# Patient Record
Sex: MALE | Race: WHITE | Employment: OTHER | URBAN - METROPOLITAN AREA
[De-identification: names, ages, dates, MRNs, and addresses within clinical notes are randomized per-mention and may not be internally consistent; named-entity substitution may affect disease eponyms.]

---

## 2018-08-08 ENCOUNTER — OFFICE VISIT (OUTPATIENT)
Dept: INTERNAL MEDICINE CLINIC | Facility: CLINIC | Age: 33
End: 2018-08-08
Payer: MEDICAID

## 2018-08-08 VITALS — DIASTOLIC BLOOD PRESSURE: 76 MMHG | TEMPERATURE: 98 F | SYSTOLIC BLOOD PRESSURE: 133 MMHG | HEART RATE: 67 BPM

## 2018-08-08 DIAGNOSIS — G89.29 CHRONIC NONINTRACTABLE HEADACHE, UNSPECIFIED HEADACHE TYPE: Primary | ICD-10-CM

## 2018-08-08 DIAGNOSIS — R33.9 URINARY RETENTION: ICD-10-CM

## 2018-08-08 DIAGNOSIS — R51.9 CHRONIC NONINTRACTABLE HEADACHE, UNSPECIFIED HEADACHE TYPE: Primary | ICD-10-CM

## 2018-08-08 DIAGNOSIS — G82.20 PARAPLEGIA AT T4 LEVEL (HCC): ICD-10-CM

## 2018-08-08 PROCEDURE — 99212 OFFICE O/P EST SF 10 MIN: CPT | Performed by: INTERNAL MEDICINE

## 2018-08-08 PROCEDURE — 99202 OFFICE O/P NEW SF 15 MIN: CPT | Performed by: INTERNAL MEDICINE

## 2018-08-08 RX ORDER — HYDROCODONE BITARTRATE AND ACETAMINOPHEN 10; 325 MG/1; MG/1
1 TABLET ORAL EVERY 4 HOURS PRN
Refills: 0 | COMMUNITY
Start: 2018-07-24

## 2018-08-08 RX ORDER — OXYCODONE HYDROCHLORIDE 30 MG/1
30 TABLET ORAL EVERY 8 HOURS PRN
COMMUNITY
Start: 2018-06-28

## 2018-08-08 RX ORDER — OXYCODONE HYDROCHLORIDE 15 MG/1
15 TABLET ORAL EVERY 8 HOURS PRN
COMMUNITY
Start: 2018-06-28

## 2018-08-08 RX ORDER — ALPRAZOLAM 1 MG/1
1 TABLET ORAL 2 TIMES DAILY
Refills: 3 | COMMUNITY
Start: 2018-06-24 | End: 2021-08-05

## 2018-08-08 NOTE — PROGRESS NOTES
HPI:    Patient ID: Kamran Lewis is a 33year old male. Headache    This is a chronic problem. The current episode started more than 1 month ago (6 mos). The problem occurs constantly. The problem has been waxing and waning.  The pain is located in t distress. He has no wheezes. He has no rales. Lymphadenopathy:     He has no cervical adenopathy. Neurological: He is alert. paraplegic   Skin: He is not diaphoretic.               ASSESSMENT/PLAN:   (R51) Chronic nonintractable headache, unspecified

## 2018-08-09 PROBLEM — G89.29 CHRONIC NONINTRACTABLE HEADACHE: Status: ACTIVE | Noted: 2018-08-09

## 2018-08-09 PROBLEM — G82.20 PARAPLEGIA AT T4 LEVEL (HCC): Status: ACTIVE | Noted: 2018-08-09

## 2018-08-09 PROBLEM — R51.9 CHRONIC NONINTRACTABLE HEADACHE: Status: ACTIVE | Noted: 2018-08-09

## 2018-09-10 ENCOUNTER — TELEPHONE (OUTPATIENT)
Dept: INTERNAL MEDICINE CLINIC | Facility: CLINIC | Age: 33
End: 2018-09-10

## 2018-09-10 NOTE — TELEPHONE ENCOUNTER
Preston/pt's father asking if Dr [place] would prescribe   Norco, OxyContin & Roxicodone for pt    Saw Dr Narcisa Ochoa 8/8  Did not provide any other information

## 2018-09-10 NOTE — TELEPHONE ENCOUNTER
I already told them before I will not prescribe these pain meds and he will need to see neurologist. He was already given referral last month.  See my progress note

## 2018-09-10 NOTE — TELEPHONE ENCOUNTER
Spoke with father and relayed EL message below--also relayed Dr. Adrien Álvarez office # to schedule appt--he verbalizes understanding and agreement. No further questions/concerns at this time.

## 2018-09-13 ENCOUNTER — TELEPHONE (OUTPATIENT)
Dept: INTERNAL MEDICINE CLINIC | Facility: CLINIC | Age: 33
End: 2018-09-13

## 2018-09-13 DIAGNOSIS — R51.9 PERSISTENT HEADACHES: Primary | ICD-10-CM

## 2018-09-13 NOTE — TELEPHONE ENCOUNTER
Insurance will likely deny request for MRI brain since it's coming from PCP/family doctor instead of neurologist that is why I had told to to see neurologist before.  I will order it for him and if medicaid denies it, then as I said before see neurologist.

## 2018-09-13 NOTE — TELEPHONE ENCOUNTER
Called pt to ask if attempt has been made to schedule appt with [facility] but per Mariluz Thibodeaux his step father is the person who handles his health, was suppose to call to make an appt. Pt will ask father to call us back.      Dr. MUNOZ pls advise on MRI order, waiting to hear back from pt's father

## 2018-10-05 ENCOUNTER — HOSPITAL ENCOUNTER (OUTPATIENT)
Dept: MRI IMAGING | Age: 33
Discharge: HOME | End: 2018-10-05
Attending: INTERNAL MEDICINE
Payer: MEDICAID

## 2018-10-05 DIAGNOSIS — R51.9 PERSISTENT HEADACHES: ICD-10-CM

## 2018-10-05 PROCEDURE — 70551 MRI BRAIN STEM W/O DYE: CPT | Performed by: INTERNAL MEDICINE

## 2018-10-17 ENCOUNTER — TELEPHONE (OUTPATIENT)
Dept: OTHER | Age: 33
End: 2018-10-17

## 2018-10-17 NOTE — TELEPHONE ENCOUNTER
Office staff=please fax the needed notes/information.     DME supplier requesting any Progress notes, phone notes and any notes the reason the patient is requiring catheter for the insurance purposes, patient's insurance will not cover the catheter unless t

## 2018-10-20 ENCOUNTER — TELEPHONE (OUTPATIENT)
Dept: INTERNAL MEDICINE CLINIC | Facility: CLINIC | Age: 33
End: 2018-10-20

## 2018-10-20 DIAGNOSIS — G82.20 PARAPLEGIA AT T4 LEVEL (HCC): Primary | ICD-10-CM

## 2018-10-20 NOTE — TELEPHONE ENCOUNTER
Regarding: Non-Urgent Medical Question  Contact: [phone]  ----- Message from Generic, Mychart sent at 10/19/2018  9:19 PM CDT -----    Doctor Hi,     My Medical supplier who sends me my Cathethers monthly had sent your office a fax requesting more inf

## 2018-10-23 ENCOUNTER — TELEPHONE (OUTPATIENT)
Dept: INTERNAL MEDICINE CLINIC | Facility: CLINIC | Age: 33
End: 2018-10-23

## 2018-10-23 NOTE — TELEPHONE ENCOUNTER
Elvira from [employer] is calling she will be faxing over orders for pt to get catheters.  Please sign and fax back to   Fax# [phone]

## 2018-10-24 ENCOUNTER — TELEPHONE (OUTPATIENT)
Dept: OTHER | Age: 33
End: 2018-10-24

## 2018-10-24 NOTE — TELEPHONE ENCOUNTER
Call received from Rep of [address] stated Pt there to p/u catheter supplies-stated Pt uses 16 FR but they only have 14 FR  Asking if ok to dispense

## 2018-10-24 NOTE — TELEPHONE ENCOUNTER
Calling and requesting to fax all the information needed to [id number] [facility] fax number [phone] ASAP. Suly Master Suly Master

## 2018-10-24 NOTE — TELEPHONE ENCOUNTER
Gloria Broach with me if pt agrees with change in size of catheter.  They may just get few catheter just for him to have some for now and then can get his regular catheter size

## 2018-10-24 NOTE — TELEPHONE ENCOUNTER
Elvira from [facility] calling to follow up.     # [phone]  [facility] 4

## 2018-10-24 NOTE — TELEPHONE ENCOUNTER
Elvira from [address] [place] [address] calling back regarding paperwork that needed to be signed by Dr Keyonna García so patient could get his catheters. Please fax back ASAPPeter Felix would like to provide patient with a short supply of 36 catheters until doctor signs of

## 2018-10-24 NOTE — TELEPHONE ENCOUNTER
Preston (caregiver ) calling and informed this nurse that patient really needs the catheter and Elvira from [facility] is willing to give it today as long as there is a verbal/phone approval from PCP.   Contacted Dr Anant Crawford via [employer], and with order to give Nikita Diaz

## 2018-10-25 NOTE — TELEPHONE ENCOUNTER
This is a new pt of the clinic and this is the first time we are filling this request from the patient. If they have latex free then give that one to the patient instead. I had only signed the request form they had faxed to us before.  I am not the original

## 2018-10-25 NOTE — TELEPHONE ENCOUNTER
Preston Caregiver indicated to disregard the request from [facility] since they do not carry the exact catheters that patient needs.  [employer] [phone], representative [phone] carries the 12 FR catheters that the patient needs and they will be faxing ov

## 2018-10-25 NOTE — TELEPHONE ENCOUNTER
[facility] has no outpt pharmacy anymore.   I did sign the form from [employer] for his catheters and is being faxed back now to the company,

## 2018-10-25 NOTE — TELEPHONE ENCOUNTER
Call transferred by CSS: Christal Levin from [facility] informed of EL's response below and will dispense catheters accordingly. Christal Levin now asking if there is a reason why this pt is on the red rubber (latex) catheters?  States most are latex free and they d

## 2018-10-26 NOTE — TELEPHONE ENCOUNTER
We had faxed the form for his cathteters yesterday  As to referral for Counts include 234 beds at the [facility], he has public aid insurance and only good for [place] as I had explained to him before.   Ok to give order for wheel chair

## 2018-10-26 NOTE — TELEPHONE ENCOUNTER
Jennifer Padgett from [employer] is calling states she needs note that support that rx was approved for his catheters.    Fax [phone]

## 2018-10-26 NOTE — TELEPHONE ENCOUNTER
Dr. Chet Anthony please see note below. Nothing in note with you about catheter. Would you be able to write a note for medical necessity?

## 2018-10-26 NOTE — TELEPHONE ENCOUNTER
Called pt and lmtcb. pls inform patient of message below from Dr. Nathan Aviles for DME wheel chair ready to  as well.

## 2018-10-29 ENCOUNTER — TELEPHONE (OUTPATIENT)
Dept: INTERNAL MEDICINE CLINIC | Facility: CLINIC | Age: 33
End: 2018-10-29

## 2018-10-29 NOTE — TELEPHONE ENCOUNTER
Shahla Rodriguez called in from [employer] requesting to edit pt's order. Shahla Rodriguez stated that pt must Self Cath 8 times per day for permanent urinal retention. Shahla Rodriguez is requesting to have pt's order faxed to [phone].   Please advise

## 2018-11-06 ENCOUNTER — TELEPHONE (OUTPATIENT)
Dept: INTERNAL MEDICINE CLINIC | Facility: CLINIC | Age: 33
End: 2018-11-06

## 2018-12-28 ENCOUNTER — TELEPHONE (OUTPATIENT)
Dept: INTERNAL MEDICINE CLINIC | Facility: CLINIC | Age: 33
End: 2018-12-28

## 2018-12-28 NOTE — TELEPHONE ENCOUNTER
Received follow up call from [facility] with Jarrell Valentino, they received orders to supply patient with his urine catheters.  They initially spoke with patient and advised him that the catheter brand he was requesting (closed system, red rubber catheter) t

## 2018-12-31 NOTE — TELEPHONE ENCOUNTER
Spoke with Aurelio Nyhan informed per pt he is staying with [facility], his medical supplier. She voiced understanding.

## 2019-03-12 ENCOUNTER — TELEPHONE (OUTPATIENT)
Dept: SURGERY | Facility: CLINIC | Age: 34
End: 2019-03-12

## 2019-03-12 NOTE — TELEPHONE ENCOUNTER
Patient scheduled himself via Mychart with Ventura DEWEY to discuss urology surgeries.  Per Ventura Hernandez, patient should see a Urologist.    Dr. Urbano Venegas, ok to schedule patient next week Thursday 3/21 at 2:00 pm? (open procedure spot)

## 2019-03-12 NOTE — TELEPHONE ENCOUNTER
He can see Warden Gutierrez initially to establish care with the group. I'm not sure he needs surgery. You can also see if one of my partners has any opening in their schedule. Please do not use a procedure slot for this one. Thanks!

## 2019-03-25 ENCOUNTER — PATIENT MESSAGE (OUTPATIENT)
Dept: INTERNAL MEDICINE CLINIC | Facility: CLINIC | Age: 34
End: 2019-03-25

## 2019-03-25 NOTE — PROGRESS NOTES
Patient called to follow up on referral status. He states he needs a referral for an out-of-network urologist at [address] [place] [address]. Their fax # [phone]    Staff, please call back pt when referral has been sent.

## 2019-03-26 NOTE — TELEPHONE ENCOUNTER
From: Leslie Jones  To: Sade Contreras MD  Sent: 3/25/2019 11:13 AM CDT  Subject: Referral Request    Hi,     I need a referral to go see a Urologist. Can you guys fax it over?  Call me for the information please   [phone]

## 2019-04-23 ENCOUNTER — TELEPHONE (OUTPATIENT)
Dept: SURGERY | Facility: CLINIC | Age: 34
End: 2019-04-23

## 2019-04-23 ENCOUNTER — OFFICE VISIT (OUTPATIENT)
Dept: SURGERY | Facility: CLINIC | Age: 34
End: 2019-04-23
Payer: MEDICAID

## 2019-04-23 VITALS — DIASTOLIC BLOOD PRESSURE: 65 MMHG | TEMPERATURE: 98 F | SYSTOLIC BLOOD PRESSURE: 123 MMHG | HEART RATE: 68 BPM

## 2019-04-23 DIAGNOSIS — N31.9 NEUROGENIC BLADDER: Primary | ICD-10-CM

## 2019-04-23 PROCEDURE — 99244 OFF/OP CNSLTJ NEW/EST MOD 40: CPT | Performed by: UROLOGY

## 2019-04-23 PROCEDURE — 99212 OFFICE O/P EST SF 10 MIN: CPT | Performed by: UROLOGY

## 2019-04-23 NOTE — TELEPHONE ENCOUNTER
Dr. Dre Pearson ordered US KIDNEY/BLADDER (SCV=[id number]). Please call insurance to obtain prior authorization. Patient was instructed to wait for our call before scheduling.

## 2019-04-23 NOTE — PROGRESS NOTES
SUBJECTIVE:  Nazia Kwok is a 29year old male who presents for a consultation at the request of, and a copy of this note will be sent to, Dr. Ilan Yu, for evaluation of  Neurogenic bladder and stress incontinence.  He states that the prob surgery for chest nerves cut and burnt off       Family History   Problem Relation Age of Onset   • Cancer Maternal Grandmother         Breast Cancer. • Cancer Maternal Grandfather         Lung Cancer- non smoker.        Social History: Social History

## 2019-04-24 ENCOUNTER — PATIENT MESSAGE (OUTPATIENT)
Dept: SURGERY | Facility: CLINIC | Age: 34
End: 2019-04-24

## 2019-04-24 ENCOUNTER — TELEPHONE (OUTPATIENT)
Dept: OTHER | Age: 34
End: 2019-04-24

## 2019-04-24 ENCOUNTER — PATIENT MESSAGE (OUTPATIENT)
Dept: INTERNAL MEDICINE CLINIC | Facility: CLINIC | Age: 34
End: 2019-04-24

## 2019-04-24 DIAGNOSIS — M54.6 CHRONIC MIDLINE THORACIC BACK PAIN: Primary | ICD-10-CM

## 2019-04-24 DIAGNOSIS — G89.29 CHRONIC MIDLINE THORACIC BACK PAIN: Primary | ICD-10-CM

## 2019-04-24 NOTE — TELEPHONE ENCOUNTER
Preston Caregiver called requesting referral for the pain clinic. States pt is a paraplegic and has Neuropathic pain. Verbalized pain medication is not helping and pt is looking for an alternative.  Will like the referral to be faxed to the pain clinic at the

## 2019-04-24 NOTE — TELEPHONE ENCOUNTER
From: Zane Lugo  To: Charlene Matt MD  Sent: 4/24/2019 11:30 AM CDT  Subject: Referral Request    Hello,     I need a Referral to [facility].  The FAX # is: [phone] please ASAP   This [facility] the [facility] for

## 2019-04-24 NOTE — TELEPHONE ENCOUNTER
----- Message from Girish Lomax sent at 4/24/2019 11:30 AM CDT -----  Regarding: Referral Request  Contact: [phone]  Hello,     I need a Referral to [facility].  The FAX # is:  [phone] please ASAP   This is for the [facility]

## 2019-05-06 ENCOUNTER — PATIENT MESSAGE (OUTPATIENT)
Dept: INTERNAL MEDICINE CLINIC | Facility: CLINIC | Age: 34
End: 2019-05-06

## 2019-05-07 NOTE — TELEPHONE ENCOUNTER
From: Nathan Chinchilla  To: Kalyn De Oliveira MD  Sent: 5/6/2019 10:40 PM CDT  Subject: Referral Request    Hi, OK so your office helped me with my referral to the pain clinic on [address] that is on the ground level.  First they don't expla

## 2019-05-08 NOTE — TELEPHONE ENCOUNTER
I dont know what pain clinic he is talking about; check the name of the pain specialist he is asking to see and we can do the referral then if they are part of the network, he has medicaid.

## 2019-05-09 NOTE — TELEPHONE ENCOUNTER
From: Jacky Messer     Sent: 5/8/2019  5:51 PM CDT       To: Lianet Ferreira MD  Subject: RE: Referral Request    Hi,     OK Listen. The 2 pain clinics on [address] in the Hospital ground floor and 3rd floor both take my insurance.  I talked t

## 2019-05-09 NOTE — TELEPHONE ENCOUNTER
I am ok for him to see pain specialists since they should be part of [facility] pain management and  as long as they do take his insurance

## 2019-05-10 ENCOUNTER — HOSPITAL ENCOUNTER (OUTPATIENT)
Dept: ULTRASOUND IMAGING | Facility: HOSPITAL | Age: 34
Discharge: HOME | End: 2019-05-10
Attending: UROLOGY
Payer: MEDICAID

## 2019-05-10 DIAGNOSIS — N31.9 NEUROGENIC BLADDER: ICD-10-CM

## 2019-05-10 PROCEDURE — 76770 US EXAM ABDO BACK WALL COMP: CPT | Performed by: UROLOGY

## 2019-05-15 ENCOUNTER — TELEPHONE (OUTPATIENT)
Dept: SURGERY | Facility: CLINIC | Age: 34
End: 2019-05-15

## 2019-05-15 NOTE — TELEPHONE ENCOUNTER
Marcus from [facility] came into office, states need chart notes explaining why doctor wants cathing every 2 hours. Please addend or create note. Also received detailed written order from [facility]     Please advise.      Once completed fax to [phone]

## 2019-05-16 ENCOUNTER — TELEPHONE (OUTPATIENT)
Dept: SURGERY | Facility: CLINIC | Age: 34
End: 2019-05-16

## 2019-05-24 NOTE — TELEPHONE ENCOUNTER
Patient to do clean intermittent catheterization every 2 hours indefinitely. This is to help decreased the frequency and amount of stress incontinence.

## 2019-05-29 ENCOUNTER — OFFICE VISIT (OUTPATIENT)
Dept: NEUROLOGY | Facility: CLINIC | Age: 34
End: 2019-05-29
Payer: MEDICAID

## 2019-05-29 VITALS
SYSTOLIC BLOOD PRESSURE: 100 MMHG | BODY MASS INDEX: 25.77 KG/M2 | WEIGHT: 180 LBS | DIASTOLIC BLOOD PRESSURE: 58 MMHG | HEART RATE: 60 BPM | HEIGHT: 70 IN | RESPIRATION RATE: 16 BRPM

## 2019-05-29 DIAGNOSIS — F41.9 ANXIETY: ICD-10-CM

## 2019-05-29 DIAGNOSIS — S24.101A SPINAL CORD INJURY AT T1-T6 LEVEL (HCC): Primary | ICD-10-CM

## 2019-05-29 DIAGNOSIS — G89.4 CHRONIC PAIN DISORDER: ICD-10-CM

## 2019-05-29 DIAGNOSIS — F32.9 REACTIVE DEPRESSION: ICD-10-CM

## 2019-05-29 DIAGNOSIS — M79.2 NEUROPATHIC PAIN: ICD-10-CM

## 2019-05-29 PROBLEM — Z98.1 HISTORY OF SPINAL FUSION: Status: ACTIVE | Noted: 2017-04-13

## 2019-05-29 PROBLEM — G47.00 INSOMNIA: Status: ACTIVE | Noted: 2017-04-13

## 2019-05-29 PROBLEM — L89.92 DECUBITUS ULCER, STAGE 2 (HCC): Status: ACTIVE | Noted: 2017-06-29

## 2019-05-29 PROCEDURE — 99243 OFF/OP CNSLTJ NEW/EST LOW 30: CPT | Performed by: PHYSICAL MEDICINE & REHABILITATION

## 2019-06-03 ENCOUNTER — OFFICE VISIT (OUTPATIENT)
Dept: SURGERY | Facility: CLINIC | Age: 34
End: 2019-06-03
Payer: MEDICAID

## 2019-06-03 DIAGNOSIS — N31.9 NEUROGENIC BLADDER: Primary | ICD-10-CM

## 2019-06-03 PROCEDURE — 51741 ELECTRO-UROFLOWMETRY FIRST: CPT | Performed by: UROLOGY

## 2019-06-03 PROCEDURE — 51728 CYSTOMETROGRAM W/VP: CPT | Performed by: UROLOGY

## 2019-06-03 PROCEDURE — 51784 ANAL/URINARY MUSCLE STUDY: CPT | Performed by: UROLOGY

## 2019-06-03 PROCEDURE — 99213 OFFICE O/P EST LOW 20 MIN: CPT | Performed by: UROLOGY

## 2019-06-03 PROCEDURE — 99212 OFFICE O/P EST SF 10 MIN: CPT | Performed by: UROLOGY

## 2019-06-03 NOTE — PROGRESS NOTES
[address]  Progress Note    CHIEF COMPLAINT:  Patient presents with:  Chest Pain: Patient presents for chest pain for the past 7 years, referred by . Patient c/o pain across chest, has no feeli Used      Tobacco comment: 10 cigs    Substance and Sexual Activity      Alcohol use: No      Drug use: Yes        Types: Cannabis        Comment: Medical marijuana      Sexual activity: Not on file      FAMILY HISTORY:   Family History   Problem Relation and are negative. Pertinent positives and negatives noted in the HPI.       PHYSICAL EXAM:   /58 (BP Location: Right arm, Patient Position: Sitting, Cuff Size: adult)   Pulse 60   Resp 16   Ht 70\"   Wt 180 lb   BMI 25.83 kg/m²     Body mass index is Parth Manuel

## 2019-06-04 ENCOUNTER — PATIENT MESSAGE (OUTPATIENT)
Dept: SURGERY | Facility: CLINIC | Age: 34
End: 2019-06-04

## 2019-06-11 ENCOUNTER — PATIENT MESSAGE (OUTPATIENT)
Dept: INTERNAL MEDICINE CLINIC | Facility: CLINIC | Age: 34
End: 2019-06-11

## 2019-06-11 ENCOUNTER — PATIENT MESSAGE (OUTPATIENT)
Dept: SURGERY | Facility: CLINIC | Age: 34
End: 2019-06-11

## 2019-06-12 ENCOUNTER — PATIENT MESSAGE (OUTPATIENT)
Dept: SURGERY | Facility: CLINIC | Age: 34
End: 2019-06-12

## 2019-06-12 NOTE — TELEPHONE ENCOUNTER
Spoke to Karmen Blankenship (HIPAA verified). Needs orders for spinal cord injury pain specialist (physiatry?). [facility] group. Preston to all physiatrists are covered. Advised to contact insurance to see which spinal cord specialist is covered.  Preston montoya

## 2019-06-12 NOTE — TELEPHONE ENCOUNTER
From: Marcio Galvin  To: Cresencio Cuellar MD  Sent: 6/11/2019 3:29 PM CDT  Subject: Referral Request    Hello,     Is my own PCP going to help me with the referrals? I always have to wait 2 weeks for an answer.    I asked Dr. Sanjay Lopez if he

## 2019-06-17 ENCOUNTER — TELEPHONE (OUTPATIENT)
Dept: OTHER | Age: 34
End: 2019-06-17

## 2019-06-17 ENCOUNTER — TELEPHONE (OUTPATIENT)
Dept: SURGERY | Facility: CLINIC | Age: 34
End: 2019-06-17

## 2019-06-17 NOTE — TELEPHONE ENCOUNTER
Spoke with pt's step father Epifanio Craig who has a signed ASH on file and I informed him that I spoke with [facility] and he said that he told the pt at the visit for the [facility] that he didn't have access to the Macroplastique, and that since pt is an established with the N

## 2019-06-17 NOTE — TELEPHONE ENCOUNTER
Will route to Urology pool.=please see patient's Might[employer]etingt message. LMTCB=transfer to triage, once patient called back, please asks about the physiatry name under his insurance.        Paula Ross RN          2:30 PM   Note      Spoke to Isma Garcia (HIPAA ve

## 2019-06-18 NOTE — PROGRESS NOTES
Marcio Galvin is a 29year old male. HPI:   No chief complaint on file. 52-year-old male accompanied by his father-in-law in follow-up to visit April 23, 2019.   He has a complex past urologic history, briefly the patient has neurogenic Maternal Grandfather         Lung Cancer- non smoker. Social History: Social History    Tobacco Use      Smoking status: Current Every Day Smoker      Smokeless tobacco: Never Used      Tobacco comment: 10 cigs    Alcohol use: No    Drug use:  Yes insurance coverage but I did encourage the patient to go back to see Dr. Saul Castillo at [facility] and consideration of these options. He will see if he can call and make an appointment for him to be seen there.   Otherwise will follow-up with me on a as neede

## 2019-06-18 NOTE — TELEPHONE ENCOUNTER
I called Srikanth Garcia who is on HIPPA. They are looking for referrals to see    1) Spinal cord specialist for pain management.        ( I read the message to him below and Srikanth Garcia stated no one told  him to do that.)    2)  A Urologist for stress management for a pro

## 2019-06-18 NOTE — TELEPHONE ENCOUNTER
There had been referrals approved already for Dr Coco Cavanaugh physiatrist and Dr Bib Spears urologist in [facility]. As to specific procedures they are asking for, that is for specialist to answer.

## 2019-06-19 NOTE — TELEPHONE ENCOUNTER
Dr Gopal Valentin saw pt yesterday and referred him to a doc at [facility] and we will take care of that referral. .

## 2019-06-19 NOTE — TELEPHONE ENCOUNTER
Dr. Simon Valdivia, pt is also calling Dr. Leobardo Fabry office asking that they give him a referral for a doctor for the Macroplastique and they sent a msg to us. Please advise. Tasked to [facility].      Dr. Fabiola Antonio, disregard the above msg I didn't realize that you saw pt y

## 2019-06-20 NOTE — TELEPHONE ENCOUNTER
Spoke with pt's step father Venus Gill who has a signed ASH on file and informed him of TERRELL's msg below and I told him that once TERRELL has the info from the [address] he will let us know and we will then contact him. He understood and will wait for a call back.

## 2019-06-20 NOTE — TELEPHONE ENCOUNTER
Call the patient back. Inform him that I have called and spoke with the manager in the operating room. We do have Macroplastique available.   The OR is currently checking on the availability of this product based on the patient's own insurance plan and wi

## 2019-06-24 ENCOUNTER — TELEPHONE (OUTPATIENT)
Dept: NEUROLOGY | Facility: CLINIC | Age: 34
End: 2019-06-24

## 2019-06-24 ENCOUNTER — PATIENT MESSAGE (OUTPATIENT)
Dept: INTERNAL MEDICINE CLINIC | Facility: CLINIC | Age: 34
End: 2019-06-24

## 2019-06-24 ENCOUNTER — NURSE TRIAGE (OUTPATIENT)
Dept: OTHER | Age: 34
End: 2019-06-24

## 2019-06-24 NOTE — TELEPHONE ENCOUNTER
Spoke to pt father regarding my chart message below. Per father pt is complaining of left upper back pain.   Per father pt \"spends a lot of time on his back because he is in a wheel chair\"    Per father he could not give any further information in rega

## 2019-06-24 NOTE — TELEPHONE ENCOUNTER
Already on max meds that can be prescribed. The ER is also a possibility. Otherwise he should see the [facility] doctors. Laila Nick

## 2019-06-24 NOTE — TELEPHONE ENCOUNTER
From: Dennie Minerva  To: Sherren Roles, MD  Sent: 6/24/2019 1:12 PM CDT  Subject: Visit Follow-up Question    Hi,     I need your help please call me. Brent Hall is complaining about his back hurting him, the top left side.  He's in a wheelc

## 2019-06-24 NOTE — TELEPHONE ENCOUNTER
Spoke with patient's father, states the patient has had increased left side upper back pain, he states the patient is in a wheel chair and does not do anything but lay down and play video games all night, takes norco and oxycodone, but had severe pain and

## 2019-07-08 ENCOUNTER — TELEPHONE (OUTPATIENT)
Dept: SURGERY | Facility: CLINIC | Age: 34
End: 2019-07-08

## 2019-07-08 NOTE — TELEPHONE ENCOUNTER
pts step father called and states he last spoke with RN Maira Patterson in regards to pt procedure. Pts step father requesting to speak with RN. pls refer to 6/17 encounter.

## 2019-07-09 ENCOUNTER — TELEPHONE (OUTPATIENT)
Dept: OTHER | Age: 34
End: 2019-07-09

## 2019-07-09 DIAGNOSIS — N31.9 NEUROGENIC BLADDER: Primary | ICD-10-CM

## 2019-07-09 NOTE — TELEPHONE ENCOUNTER
[employer] message sent to the patient about referral and manage care phone number. Manage care please follow up. Thank you.

## 2019-07-09 NOTE — TELEPHONE ENCOUNTER
LMTCB    Please see Pt's message/advise-unable to reach for Dx reason for Referral        From: Jacky Messer     Sent: 7/8/2019 12:05 PM CDT       To: Lianet Ferreira MD  Subject: Visit Follow-up Question    Alden Bledsoe Dr.

## 2019-07-09 NOTE — TELEPHONE ENCOUNTER
----- Message from Girish Weldon sent at 7/9/2019  9:47 AM CDT -----  Regarding: RE: Visit Follow-up Question  Contact: [phone]  Rakan,     Was the Prior Authorization Filled out and sent to my [employer]? Please let me know.    It can be reason for the visit with Dr Kenneth Garcias.   Dr Sarah Cunningham has to enter the reason in the referral.    Thank you,    Irwin Amaral RN    ----- Message -----

## 2019-07-10 ENCOUNTER — PATIENT MESSAGE (OUTPATIENT)
Dept: SURGERY | Facility: CLINIC | Age: 34
End: 2019-07-10

## 2019-07-10 NOTE — TELEPHONE ENCOUNTER
I contacted Karmen Blankenship who is on HIPPA and informed. He stated that the insurance company when he spoke with them stated an authorization is needed. I transferred the call to the Manage care department. I stated we can fax the order over when needed.     Man

## 2019-07-11 NOTE — TELEPHONE ENCOUNTER
The patient is looking for an approval for a test ordered by Dr. Nadia Ramos, not a office visit referral. As documentated in her message sent 7/1/19 at 9:18 PM. [facility] doesn't handle imaging ordered by Dr. Nadia Ramos, see his US order dated 5/10/19.  She needs to be co

## 2019-07-12 NOTE — TELEPHONE ENCOUNTER
Left message that we need assistance to coordinate care for this patient. Left message with Dr. Serra Persons office to get more detail from them as to what is needed for this patient.

## 2019-07-15 ENCOUNTER — TELEPHONE (OUTPATIENT)
Dept: SURGERY | Facility: CLINIC | Age: 34
End: 2019-07-15

## 2019-07-15 NOTE — TELEPHONE ENCOUNTER
Spoke with Naeem Oropeza from [facility]. Per referral to Dr. Meseret Morales MD will not take pts insurance.  Naeem Oropeza stated she sent patient a list of Urologists that are covered by his plan a while ago but she would call back our office with a list of Urologists close t

## 2019-07-15 NOTE — TELEPHONE ENCOUNTER
Jerry Hagan called from [facility] and [facility] she spoke with someone in the office and states she was instructed to cb with the name of another urologist in network. [facility]  Can ref pt to [facility].  pls advise thank you

## 2019-07-15 NOTE — TELEPHONE ENCOUNTER
Left a message with the [facility] care coordinator. Urology can you please assist  on what is needed for this patient?

## 2019-07-16 NOTE — TELEPHONE ENCOUNTER
Sandy Louis           7/15/19 12:06 PM   Note      Amirah Strickland called from [place] [facility] [place] [facility]"" she spoke with someone in the office and states she was instructed to cb with the name of another urologist in network. [facility]  Can ref pt to [employer].  pls

## 2019-07-16 NOTE — TELEPHONE ENCOUNTER
Epifanio Craig who is on HIPPA notified of the Doctors name below. He stated he does not know if they want to go to [facility]. Will call back with the patient's response. I stated if needed they can pay out of pocket to see another physician.

## 2019-07-17 ENCOUNTER — TELEPHONE (OUTPATIENT)
Dept: INTERNAL MEDICINE CLINIC | Facility: CLINIC | Age: 34
End: 2019-07-17

## 2019-07-17 DIAGNOSIS — N31.9 NEUROGENIC BLADDER: Primary | ICD-10-CM

## 2019-07-17 NOTE — TELEPHONE ENCOUNTER
Call received from [facility]) reporting pt has been c/o chest pain for a while and crying with pain he is paraplegic and has had neuropathic pain. Bebe Lugo is asking if he will take pt to ER or ask Dr Lynda Beaver to order Chest CT scan.  Orville Ballard to take pt

## 2019-07-18 ENCOUNTER — TELEPHONE (OUTPATIENT)
Dept: FAMILY MEDICINE CLINIC | Facility: CLINIC | Age: 34
End: 2019-07-18

## 2019-07-18 NOTE — TELEPHONE ENCOUNTER
Not sure what he is referring to in terms of procedure he was not notified about.     My office had communicated with him that the Macroplastique injectable on own costs almost 600 dollars and his Medicaid insurance would reimburse a total of 250 $ for the

## 2019-07-18 NOTE — TELEPHONE ENCOUNTER
Spoke with Diego Grayson, the patient's POA, who reports the patient did not go to the ER yesterday 7/17/19. Diego Grayson reports the patient suffers from neuropathic pain and the chest pain is typical for a patient with a spinal cord injury.      Diego Grayson reports the patient n

## 2019-07-18 NOTE — TELEPHONE ENCOUNTER
We  can order mri but  Will likely  be denied by his insurance public aid if I order it being only family doctor so  it's  better ordered by the specialist.

## 2019-07-18 NOTE — TELEPHONE ENCOUNTER
Smitha Hu called us back for 2 referrals. Needs a different urologist as Dr. Ludin Bocanegra is not willing to provide the injection of botox to help the patient from having leaky bladder.  Smitha Hu is not sure why Dr. Ludin Bocanegra is not willing to do so as it helps spinal injury

## 2019-07-19 ENCOUNTER — TELEPHONE (OUTPATIENT)
Dept: INTERNAL MEDICINE CLINIC | Facility: CLINIC | Age: 34
End: 2019-07-19

## 2019-07-19 ENCOUNTER — OFFICE VISIT (OUTPATIENT)
Dept: INTERNAL MEDICINE CLINIC | Facility: CLINIC | Age: 34
End: 2019-07-19
Payer: MEDICAID

## 2019-07-19 VITALS
RESPIRATION RATE: 12 BRPM | BODY MASS INDEX: 40.47 KG/M2 | WEIGHT: 267 LBS | HEIGHT: 68 IN | TEMPERATURE: 99 F | SYSTOLIC BLOOD PRESSURE: 110 MMHG | DIASTOLIC BLOOD PRESSURE: 62 MMHG | HEART RATE: 74 BPM

## 2019-07-19 DIAGNOSIS — M79.2 NEUROPATHIC PAIN: ICD-10-CM

## 2019-07-19 DIAGNOSIS — G82.20 PARAPLEGIA AT T4 LEVEL (HCC): Primary | ICD-10-CM

## 2019-07-19 DIAGNOSIS — R07.89 ANTERIOR CHEST WALL PAIN: ICD-10-CM

## 2019-07-19 DIAGNOSIS — N31.9 NEUROGENIC BLADDER: ICD-10-CM

## 2019-07-19 PROCEDURE — 99213 OFFICE O/P EST LOW 20 MIN: CPT | Performed by: INTERNAL MEDICINE

## 2019-07-19 RX ORDER — DIPHENHYDRAMINE HCL 25 MG
25 CAPSULE ORAL
COMMUNITY
Start: 2013-09-28 | End: 2020-11-17

## 2019-07-19 RX ORDER — CHOLECALCIFEROL (VITAMIN D3) 25 MCG
1 CAPSULE ORAL
COMMUNITY

## 2019-07-19 NOTE — TELEPHONE ENCOUNTER
KAYLIN Mckeon,    I just saw Naye Akins and he said he is willing to pay for the cost of the Macroplatisque injection out of pocket. pls let me know if your able to do it for him and will have him call your office to set up.  Thanks and I always appreciate your hel

## 2019-07-20 NOTE — PROGRESS NOTES
HPI:    Patient ID: Kristie Choe is a 29year old male. Back Pain   This is a chronic problem. The current episode started more than 1 year ago. The problem occurs constantly. The problem has been waxing and waning since onset.  The pain is told him we can refer him to neurosurgeon for their opinion. Pt referred to  [facility]. (R07.89) Anterior chest wall pain  Plan: NEUROSURGERY - INTERNAL        See above .    (M79.2) Neuropathic pain  Plan: NEUROSURGERY - INTERNAL        See above.      (N

## 2019-07-20 NOTE — TELEPHONE ENCOUNTER
Amadou Heath    dont know if you got my earlier text to you regarding Qasim Staton. I saw him in the office and he told me he is willing to pay the whole cost of the Macroplastique injection. pls let me know if you will be able to do it for him.  Always appreciate y

## 2019-07-22 ENCOUNTER — PATIENT MESSAGE (OUTPATIENT)
Dept: SURGERY | Facility: CLINIC | Age: 34
End: 2019-07-22

## 2019-07-24 NOTE — TELEPHONE ENCOUNTER
Called patient' father Gracie Dudley, informed that per [facility]' recommendations \" We are unable to do this procedure at [place] essentially because his insurance would not cover it. \"  Gracie Dudley will stop by office, to  urogram results, will place at front d

## 2019-07-29 ENCOUNTER — PATIENT MESSAGE (OUTPATIENT)
Dept: SURGERY | Facility: CLINIC | Age: 34
End: 2019-07-29

## 2019-08-01 NOTE — TELEPHONE ENCOUNTER
See other 7/29 my chart enct. I tasked it to [facility] [place] and I will try to speak with him about it when he returns to the office on Monday 8/5.

## 2019-08-05 ENCOUNTER — TELEPHONE (OUTPATIENT)
Dept: SURGERY | Facility: CLINIC | Age: 34
End: 2019-08-05

## 2019-08-05 DIAGNOSIS — R39.9 SYMPTOM INVOLVING BLADDER: Primary | ICD-10-CM

## 2019-08-05 DIAGNOSIS — Z01.818 PREOP EXAMINATION: ICD-10-CM

## 2019-08-05 DIAGNOSIS — N31.9 NEUROGENIC BLADDER: ICD-10-CM

## 2019-08-05 NOTE — TELEPHONE ENCOUNTER
Spoke with patient' father Venus Gill, verified , scheduled Cystoscopy, bladder neck injection, (MacroPlastique), 19, [facility]/outpatient went pre-op instructions, mailed out, will have labs done prior to procedure, all questions answ

## 2019-08-13 ENCOUNTER — TELEPHONE (OUTPATIENT)
Dept: SURGERY | Facility: CLINIC | Age: 34
End: 2019-08-13

## 2019-08-13 DIAGNOSIS — N31.9 NEUROGENIC BLADDER: Primary | ICD-10-CM

## 2019-08-13 NOTE — TELEPHONE ENCOUNTER
patient was cancelling surgery with Dr. Chandrakant Hopson because Dad states pt is in a lot of pain.     Informed patient' father that procedure will be cancelled per their request.

## 2019-08-14 ENCOUNTER — PATIENT MESSAGE (OUTPATIENT)
Dept: NEUROLOGY | Facility: CLINIC | Age: 34
End: 2019-08-14

## 2019-08-14 ENCOUNTER — TELEPHONE (OUTPATIENT)
Dept: OTHER | Age: 34
End: 2019-08-14

## 2019-08-14 NOTE — TELEPHONE ENCOUNTER
From: Miya Duckworth  To: Mima Buerger, MD  Sent: 8/14/2019 4:30 PM CDT  Subject: Visit Follow-up Question    Leann Gill has a bulge in his upper back. This could be from the hardware stabilizer that holds your spine in place.  Need help

## 2019-08-14 NOTE — TELEPHONE ENCOUNTER
Doretha Puentes, unable to get a hold of the father at this time. Please see his message and advise. Veronika Burns   to Manny Kraft MD           8/14/19 4:29 PM   Hi, Lesia Zimmer has a bulge in his upper back.  This could be from

## 2019-08-14 NOTE — PROGRESS NOTES
I recommend he call Dr. Ada Weaver, his neurosurgeon. If this is due to a new injury, I recommend going to the ER to get xrays. If Dr. Ada Weaver is not available consider Dr. Neena Coronel at [facility].

## 2019-08-15 ENCOUNTER — TELEPHONE (OUTPATIENT)
Dept: SURGERY | Facility: CLINIC | Age: 34
End: 2019-08-15

## 2019-08-15 NOTE — TELEPHONE ENCOUNTER
LMTCB, regarding mychart, (check pt current status)    Contacted [facility] care dept, recommending to contact insurance first to find neurosurgeon providers covered by insurance, once this info is received we can forward a referral to their department to pro

## 2019-08-15 NOTE — TELEPHONE ENCOUNTER
pls check with managed care if they know neurosurgeon we can refer pt to ( current group we have in [facility] doesn't accept his insurance).  Also call pt/stepfather to see what it is going on if pt needs to go to ER

## 2019-08-15 NOTE — TELEPHONE ENCOUNTER
diamond from [facility] called. Received a call from the pt stating pt was advised by hosp that the hosp is not in network. Please call for prior auth.   [phone]

## 2019-08-16 NOTE — TELEPHONE ENCOUNTER
Left a detailed message per noted below on Preston's cell (per ASH) and advised him to call us back with any questions.

## 2019-08-18 ENCOUNTER — PATIENT MESSAGE (OUTPATIENT)
Dept: INTERNAL MEDICINE CLINIC | Facility: CLINIC | Age: 34
End: 2019-08-18

## 2019-08-19 ENCOUNTER — TELEPHONE (OUTPATIENT)
Dept: OTHER | Age: 34
End: 2019-08-19

## 2019-08-19 DIAGNOSIS — N31.9 NEUROGENIC BLADDER: Primary | ICD-10-CM

## 2019-08-19 NOTE — TELEPHONE ENCOUNTER
Because of acute and worsening back pain he is having, he had been referred to ER for evaluation by Dr Zonia Obando and me. We cant just order xrays or mri without evaluation of the patient. (see previous notes from Dhaval 1).    I can see him at 2:30pm today if h

## 2019-08-19 NOTE — TELEPHONE ENCOUNTER
Received call from [address] [place] [address]. States patient was cancelling surgery with Dr. Sissy Ellsworth because Dad states pt is in a lot of pain. Please see [employer] message. Dad is requesting XRay of his back.

## 2019-08-19 NOTE — TELEPHONE ENCOUNTER
Father agreed to come in for evaluation, requesting if able to come in tomorrow any time, please advise if able to double book for tomorrow.

## 2019-08-19 NOTE — TELEPHONE ENCOUNTER
Nancy Gee the patient's father stated that the patient has been having 9/10 pain which is relieved with pain medications. He has been sending pictures of his back through [employer] and cannot understand why Dr. Katerine Huber or Dr. Cnocepcion Richardson cannot order an MRI.     H

## 2019-08-19 NOTE — TELEPHONE ENCOUNTER
From: Demario Ennis  To: Sharon Camacho MD  Sent: 8/18/2019 9:34 PM CDT  Subject: Other    Please Issue me an X-RAY Order of my SPINE and CHEST. I will be going to a private imaging center and want them to provide the X-Ray's of both.  Carlos Cameron

## 2019-08-20 ENCOUNTER — OFFICE VISIT (OUTPATIENT)
Dept: INTERNAL MEDICINE CLINIC | Facility: CLINIC | Age: 34
End: 2019-08-20
Payer: MEDICAID

## 2019-08-20 VITALS
HEIGHT: 71 IN | BODY MASS INDEX: 23.1 KG/M2 | SYSTOLIC BLOOD PRESSURE: 95 MMHG | DIASTOLIC BLOOD PRESSURE: 60 MMHG | HEART RATE: 54 BPM | WEIGHT: 165 LBS

## 2019-08-20 DIAGNOSIS — M54.6 CHRONIC BILATERAL THORACIC BACK PAIN: Primary | ICD-10-CM

## 2019-08-20 DIAGNOSIS — G89.29 CHRONIC BILATERAL THORACIC BACK PAIN: Primary | ICD-10-CM

## 2019-08-20 PROCEDURE — 99213 OFFICE O/P EST LOW 20 MIN: CPT | Performed by: INTERNAL MEDICINE

## 2019-08-21 ENCOUNTER — TELEPHONE (OUTPATIENT)
Dept: SURGERY | Facility: CLINIC | Age: 34
End: 2019-08-21

## 2019-08-21 NOTE — TELEPHONE ENCOUNTER
[facility] find [facility] why [facility]' procedure was [facility], I informed that due to patient having back problems, father carmela called asking us to cancel procedure until patient is able to proceed, I also stated to carmela to call us so

## 2019-08-22 NOTE — PROGRESS NOTES
HPI:    Patient ID: Geneva Wright is a 29year old male. melanie presents today with complaint of feeling deformity on his upper back and is concern about metal brace he has in his spine. He denies having any falls or trauma.  He has chronic Eyes: Conjunctivae are normal. Right eye exhibits no discharge. Left eye exhibits no discharge. No scleral icterus. Neck: Neck supple. No tracheal deviation present. No thyromegaly present.    Cardiovascular: Normal rate, regular rhythm and normal heart s

## 2019-08-26 ENCOUNTER — HOSPITAL ENCOUNTER (OUTPATIENT)
Dept: GENERAL RADIOLOGY | Age: 34
Discharge: HOME | End: 2019-08-26
Attending: INTERNAL MEDICINE
Payer: MEDICAID

## 2019-08-26 DIAGNOSIS — G89.29 CHRONIC BILATERAL THORACIC BACK PAIN: ICD-10-CM

## 2019-08-26 DIAGNOSIS — M54.6 CHRONIC BILATERAL THORACIC BACK PAIN: ICD-10-CM

## 2019-08-26 PROCEDURE — 72072 X-RAY EXAM THORAC SPINE 3VWS: CPT | Performed by: INTERNAL MEDICINE

## 2019-08-27 ENCOUNTER — TELEPHONE (OUTPATIENT)
Dept: FAMILY MEDICINE CLINIC | Facility: CLINIC | Age: 34
End: 2019-08-27

## 2019-08-27 NOTE — TELEPHONE ENCOUNTER
Patient needs report form Xray that was completed on 08/26/2019. Patient requesting to  the report. Patient has the images but needs the report     Requesting to  today please call when ready.

## 2019-08-28 ENCOUNTER — TELEPHONE (OUTPATIENT)
Dept: INTERNAL MEDICINE CLINIC | Facility: CLINIC | Age: 34
End: 2019-08-28

## 2019-08-28 DIAGNOSIS — T84.216A: Primary | ICD-10-CM

## 2019-08-28 NOTE — TELEPHONE ENCOUNTER
Spoke with patient's father Kennedy Pedroza, advised per  he needs to follow up with neurosurgeon, also advised to go to ER if pain persists and to address hardware issues.  Patient's father was upset, wanted him to get an MRI before seeing a surgeon and hung

## 2019-08-28 NOTE — TELEPHONE ENCOUNTER
Patient requesting MRI of Spine to be ordered and wants to make sure no additional damage to bones and if only a hardware fracture. Per Father MRI's are covered by Insurance.      Please advise

## 2019-08-29 NOTE — TELEPHONE ENCOUNTER
Recommendations per Dr. Jas Huerta sent to patient via [address] including the order for the MRI.

## 2019-08-29 NOTE — PROGRESS NOTES
Once again Keyur's father has called back looking for imaging. He sent me pictures of Keyur's upper back stating something was newly wrong. He received an xray showing hardware fx at T10 which does not correspond to the area of pain in the picture.  In an

## 2019-08-29 NOTE — TELEPHONE ENCOUNTER
Ok to order mri thoracic spine but pt needs to see neurosurgeon/spine specialist to evaluate/manage this abnormality seen.

## 2019-08-29 NOTE — TELEPHONE ENCOUNTER
Spoke to kavon Ponce. Gave him information from Dr Marizol Wells direction in BARBARA Mcdonald`s note 8/28/19 and Dr Marizol Wells note 8/29/19. Rosario states they do not want to see Dr Viktoriya Barillas because they have not seen him in over 5 years.  Advised can still make irma

## 2019-08-31 ENCOUNTER — TELEPHONE (OUTPATIENT)
Dept: INTERNAL MEDICINE CLINIC | Facility: CLINIC | Age: 34
End: 2019-08-31

## 2019-08-31 NOTE — TELEPHONE ENCOUNTER
pls call pt or father and ffup if they had checked with insurance for neurosurgeons or ortho spine specialists he can see. I had been asking  them before,  pt needs to see one so he can be evaluated and treated.   Dr waddell had recommended for them to see D

## 2019-09-03 NOTE — TELEPHONE ENCOUNTER
Contacted patient's father, Ravindra Adair. The patient saw Dr Jessica Hargrove today and will be scheduling a follow up appointment.  Ravindra Adair will call you back and give you an update after the next appt

## 2019-09-04 ENCOUNTER — TELEPHONE (OUTPATIENT)
Dept: CASE MANAGEMENT | Age: 34
End: 2019-09-04

## 2019-09-04 NOTE — TELEPHONE ENCOUNTER
Hi Dr. Lucy Orozco has denied the MRI for the following reason. Patient has been notified.      Thank you,   Jonelle Fatima

## 2019-09-06 NOTE — TELEPHONE ENCOUNTER
Notify pt/father that clinical information/notes are sent to the insurance whenever our managed care gets PA for tests like MRI ( see below).  public aid  insurance denies orders for tests that are being ordered by family doctors like me very commonly espec

## 2019-09-06 NOTE — TELEPHONE ENCOUNTER
We can send the my notes as well as copy of his spine xray to his insurance. This is the problem with his insurance, they deny tests like mri ordered by PCP and would want specialist to be the one to order them even before they would approve it.

## 2019-09-06 NOTE — TELEPHONE ENCOUNTER
Spoke with father Greer Lopez ( and [employer]SH verified)--he states [employer] denied MRI because no clinical notes were sent to them. He is requesting Managed Care to fax clinical notes to [facility] and have EL do a peer-to peer.  Relayed denial letter

## 2019-09-06 NOTE — TELEPHONE ENCOUNTER
Clinical is always sent with Pa requests, the Xray and office visits. They are looking for the items listed which are 6 weeks of conservative treatment to include pain medicine  and current PT.  He also needs a re-evaluation after the conservative treatment

## 2019-09-11 NOTE — TELEPHONE ENCOUNTER
The son called asking why it was denied. I read over the notes again. The son does not seem to understand. He does not want his father to see a spine specialist for he thing it is not necessary.     He is going to try and appeal.

## 2019-09-13 ENCOUNTER — TELEPHONE (OUTPATIENT)
Dept: INTERNAL MEDICINE CLINIC | Facility: CLINIC | Age: 34
End: 2019-09-13

## 2019-09-13 NOTE — TELEPHONE ENCOUNTER
Called patient asked to speak to patient, person answered was James Knott that is he listed as verbal on POA. Put him on hold to verify, checked verbal release. 5/29/19- ok      Apologized for the hold and informed James Knott referral was approved for MRI.  He discus

## 2019-09-16 ENCOUNTER — HOSPITAL ENCOUNTER (OUTPATIENT)
Dept: MRI IMAGING | Age: 34
Discharge: HOME | End: 2019-09-16
Attending: INTERNAL MEDICINE
Payer: MEDICAID

## 2019-09-16 DIAGNOSIS — T84.216A: ICD-10-CM

## 2019-09-16 PROCEDURE — 72146 MRI CHEST SPINE W/O DYE: CPT | Performed by: INTERNAL MEDICINE

## 2019-10-06 ENCOUNTER — NURSE TRIAGE (OUTPATIENT)
Dept: INTERNAL MEDICINE CLINIC | Facility: CLINIC | Age: 34
End: 2019-10-06

## 2019-10-06 DIAGNOSIS — R21 RASH AND NONSPECIFIC SKIN ERUPTION: Primary | ICD-10-CM

## 2019-10-07 NOTE — TELEPHONE ENCOUNTER
Returned call to patient's father and provided referral information for Dr. Otis Cao. He verbalized understanding and had no further questions at this time.

## 2019-10-07 NOTE — TELEPHONE ENCOUNTER
pls check with pt/father if he already had seen spine surgeon since he had  His mri of his spine done. I hadnt recerived any consult notes from any spine surgeron.

## 2019-10-07 NOTE — TELEPHONE ENCOUNTER
Action Requested: Summary for Provider     []  Critical Lab, Recommendations Needed  [] Need Additional Advice  []   FYI    [x]   Need Orders  [] Need Medications Sent to Pharmacy  []  Other     SUMMARY: Father returned call and responds that pt has appt s

## 2019-11-20 ENCOUNTER — PATIENT MESSAGE (OUTPATIENT)
Dept: INTERNAL MEDICINE CLINIC | Facility: CLINIC | Age: 34
End: 2019-11-20

## 2019-11-20 ENCOUNTER — PATIENT MESSAGE (OUTPATIENT)
Dept: NEUROLOGY | Facility: CLINIC | Age: 34
End: 2019-11-20

## 2019-11-20 NOTE — TELEPHONE ENCOUNTER
From: Sherryle Bales Winiarski  To: Jose Eduardo Lainez MD  Sent: 11/20/2019 1:19 PM CST  Subject: Test Results Question    Dr Tana Nevarez, today the ladies on 1st floor didn't want to release my MRI images and X-rays to [facility]. He has power of .  Can you pleas

## 2019-11-21 NOTE — TELEPHONE ENCOUNTER
From: Gopal Duckworth  To: Louise Lauren MD  Sent: 11/20/2019 1:18 PM CST  Subject: Test Results Question    Dr Thiago Perera, today the ladies on 1st floor didn't want to release my MRI images and X-rays to [facility]. He has power of .  Can you pl

## 2019-11-21 NOTE — TELEPHONE ENCOUNTER
From: Gonzalez Duckworth  To: Gela Alvarado MD  Sent: 11/20/2019 11:59 PM CST  Subject: Other    Dr. Lynda Zamora, who sends the messages for you to us? ? My Images ARE already on a [address] [facility] sitting downstairs  radiology.  I ONLY want JESSICA to go pi

## 2019-11-27 NOTE — TELEPHONE ENCOUNTER
Left VM for Ramos Lackey to tell him that we can't  his discs. Dr Rey Thurston was not the original prescriber of the MRI's as they were ordered by Dr. Estevan Zhu.   Told him to bring his power of Atty paperwork and he should be able to  from the records dep

## 2020-02-13 ENCOUNTER — TELEPHONE (OUTPATIENT)
Dept: SURGERY | Facility: CLINIC | Age: 35
End: 2020-02-13

## 2020-02-13 NOTE — TELEPHONE ENCOUNTER
Received fax from [address] [place] [address] for detailed order for cath supplies. Patient transferred care to [address] [place] [address], with Fayrene Rudy. Faxed cover sheet with message above. Confirmation received.

## 2020-06-22 NOTE — TELEPHONE ENCOUNTER
From: Neil Duckworth  To: Andre Gómez MD  Sent: 6/22/2020 3:12 PM CDT  Subject: Visit Follow-up Question    Dr MUNOZ, can you please put in another order for the CT scan?  Can Christayoli Mcknightom come tomorrow evening to the [facility] radiology dept t

## 2020-06-24 NOTE — TELEPHONE ENCOUNTER
/ Albert Do please see patient email and advise  If you need we can schedule a televisit   Please advise.       : JULIO Cruz      From: Suzan Mccartney      Created: 6/23/2020 10:00 PM        *-*-*This message has not been handled. *-*-*

## 2020-06-24 NOTE — TELEPHONE ENCOUNTER
We can refer him to [facility] for neuro-psych that Preston(father of pt) was asking for.   As to getting ct scan, pt has been seen and evaluated by neurosurgeon at [facility]  Dr Lee and would recommend father/pt to call his office regarding g

## 2020-06-25 ENCOUNTER — PATIENT MESSAGE (OUTPATIENT)
Dept: INTERNAL MEDICINE CLINIC | Facility: CLINIC | Age: 35
End: 2020-06-25

## 2020-06-26 NOTE — TELEPHONE ENCOUNTER
Tried to call Nina Obando father who was requesting the scan for pt's thoracic spine. Left message to call us back. Pt had been evaluated and being treated in [address].  Had XENIA done 3mos ago so would really need to ffup with them and decide what would be their n

## 2020-06-26 NOTE — TELEPHONE ENCOUNTER
From: Izabella Galeano  To: Hal Becerra MD  Sent: 6/25/2020 9:22 PM CDT  Subject: Visit Follow-up Question    Dr. Yanet Lawrence, can have the order for the CT scan placed and the CT scan can be done in any hospital. Including [facility] nearest which is [facility]

## 2020-06-26 NOTE — TELEPHONE ENCOUNTER
[employer] message with previous MD recommendation sent to pt. Routed to Dr. Keyonna García for [address] [place], thanks.

## 2020-06-29 NOTE — TELEPHONE ENCOUNTER
Left detailed message that Dr. Concepcion Richardson wants the specialist to order the CT, so that the correct test is ordered. Once ordered, it can be done at [facility], just let them know that is where you want to have it done.   [facility] will accept an order from

## 2020-06-29 NOTE — TELEPHONE ENCOUNTER
pls call Alice Dawson father of pt;  Pt is already had been referred and currently under treatment by the spine specialist/pain specialist at [facility]. He should contact the specialist and ffup with them as to further evaluation and treatment.  I am quite sure they a

## 2020-07-17 ENCOUNTER — PATIENT MESSAGE (OUTPATIENT)
Dept: INTERNAL MEDICINE CLINIC | Facility: CLINIC | Age: 35
End: 2020-07-17

## 2020-07-17 ENCOUNTER — TELEPHONE (OUTPATIENT)
Dept: INTERNAL MEDICINE CLINIC | Facility: CLINIC | Age: 35
End: 2020-07-17

## 2020-07-17 NOTE — TELEPHONE ENCOUNTER
Dr. Vivek Vargas, please see first paragraph of message below. Second paragraph of e-mail below is about the Jonathan Delatorre. Discussed with Vanessa Maya that he has to send information about himself through his own chart.        Note pictures attached are of the POA

## 2020-07-17 NOTE — TELEPHONE ENCOUNTER
Please call patient to further discuss (also see attached pictures in MyChart)            ----- Message from [facility] DANIEL Henson sent at 7/17/2020  1:45 AM CDT -----  Regarding: Visit Follow-up Question  Contact: [phone]  Dr. Damien Munguia took out Jesus Alberto quinones

## 2020-07-17 NOTE — TELEPHONE ENCOUNTER
I tried calling back pt/carmela but no answer. Left message to call us back since I cant really get a good/clear view of the picture he had sent me so told to call back and we can do video of if so I can see better.

## 2020-07-17 NOTE — TELEPHONE ENCOUNTER
Called Alice Dawson again and he is at work so will talk to me in one hour . Will call him back then.

## 2020-07-17 NOTE — TELEPHONE ENCOUNTER
From: Neyda Duckworth  To: Maddie Craig MD  Sent: 7/17/2020 1:45 AM CDT  Subject: Visit Yvette Vidales took out Keyur's images. Dr Milton Call didn't notice top hardware was loose? On mylo? WE had mylo in [facility].  chief complai

## 2020-08-21 ENCOUNTER — PATIENT MESSAGE (OUTPATIENT)
Dept: INTERNAL MEDICINE CLINIC | Facility: CLINIC | Age: 35
End: 2020-08-21

## 2020-08-21 DIAGNOSIS — M48.04 THORACIC SPINAL STENOSIS: Primary | ICD-10-CM

## 2020-08-21 DIAGNOSIS — T84.216D: ICD-10-CM

## 2020-08-21 DIAGNOSIS — M54.50 BACK PAIN OF THORACOLUMBAR REGION: ICD-10-CM

## 2020-08-21 DIAGNOSIS — M54.6 BACK PAIN OF THORACOLUMBAR REGION: ICD-10-CM

## 2020-08-21 NOTE — TELEPHONE ENCOUNTER
[employer] message sent. From: Alejandro Duckworth  To: Cara Ying MD  Sent: 8/21/2020  2:38 PM CDT  Subject: Test Results Question    Can you please send the CT & MRI orders so we can go get them done?

## 2020-08-24 NOTE — TELEPHONE ENCOUNTER
I had ordered CT scan of thoracolumbar spine as had been ordered and done by [facility] back in Dec 2019.

## 2020-08-25 NOTE — TELEPHONE ENCOUNTER
Copy of all 3 orders copy and pasted into letters and sent to patient. Email response sent to patient.

## 2020-08-25 NOTE — TELEPHONE ENCOUNTER
Patient's POA, [facility], is calling to follow up and is requesting to  copies of orders for patient's MRI/ CT Scan at the [facility]. Please contact [facility] when copies are ready for .

## 2020-08-26 ENCOUNTER — TELEPHONE (OUTPATIENT)
Dept: INTERNAL MEDICINE CLINIC | Facility: CLINIC | Age: 35
End: 2020-08-26

## 2020-08-26 NOTE — TELEPHONE ENCOUNTER
Hello,    Patient’s Health Plan has denied PA request for CT THORACIC/LUMBAR SPINE. Per Health Plan clinical does not meet medical guidelines. A detail denial letter has been faxed to your office. Please contact patient for re-direction of care.  If you wou

## 2020-08-26 NOTE — TELEPHONE ENCOUNTER
Notify pt or father; ct scan order had been denied by insurance. Pt had mentioned in one of his email that they are willing to pay for the tests, if this is so then they can use the order we gave them.

## 2020-08-28 ENCOUNTER — TELEPHONE (OUTPATIENT)
Dept: INTERNAL MEDICINE CLINIC | Facility: CLINIC | Age: 35
End: 2020-08-28

## 2020-08-28 NOTE — TELEPHONE ENCOUNTER
Rashmi from [facility], requesting for MRI of spine to be faxed to them at [phone].  Also asking if it is with or without contrast

## 2020-08-31 ENCOUNTER — TELEPHONE (OUTPATIENT)
Dept: INTERNAL MEDICINE CLINIC | Facility: CLINIC | Age: 35
End: 2020-08-31

## 2020-08-31 NOTE — TELEPHONE ENCOUNTER
Managed Care, I saw the message that CT was denied by patient's insurance. Now there is an order for an MRI. Has this been approved? I see notation that patient's father will pay out of pocket. I'm not clear on what is the next step to help the patient.   I

## 2020-09-01 NOTE — TELEPHONE ENCOUNTER
MRI order printed, no contrast per verbal order from Dr. Yunior Pyle. Written on order. Order was faxed to [facility] as request but we do not have an Anaissasha Kwok from insurance. Is he self pay??? Called Mariam Moran (father) and [facility]. This was written on order as well.

## 2020-09-15 NOTE — TELEPHONE ENCOUNTER
Den, say your message that the MRI was denied. Do you know if the patient was contacted with this information?

## 2020-09-21 ENCOUNTER — TELEPHONE (OUTPATIENT)
Dept: INTERNAL MEDICINE CLINIC | Facility: CLINIC | Age: 35
End: 2020-09-21

## 2020-09-21 NOTE — TELEPHONE ENCOUNTER
Letter sent to [facility] in response for their request for clinical notes from 2020. Advised them that we have no clinical notes for this patient from 2020; but, noted that patient has seen other practitioners this year.  Suggested they

## 2020-09-22 ENCOUNTER — PATIENT MESSAGE (OUTPATIENT)
Dept: INTERNAL MEDICINE CLINIC | Facility: CLINIC | Age: 35
End: 2020-09-22

## 2020-09-22 NOTE — TELEPHONE ENCOUNTER
Yoana response sent to patient. Omega Mcmillan LPN    0/01/75 8:32 PM  Note     Letter sent to [facility] in response for their request for clinical notes from 2020.  Advised them that we have no clinical notes for this patient

## 2020-09-22 NOTE — TELEPHONE ENCOUNTER
From: Amado Duckworth  To: Nathaniel Dejesus MD  Sent: 9/22/2020 12:41 PM CDT  Subject: Test Results Question    Dr. Zara Gilford, have you sent in the requested notes from a date of this year to the Imaging place in [facility]?    They keep waitin

## 2020-10-21 ENCOUNTER — TELEPHONE (OUTPATIENT)
Dept: INTERNAL MEDICINE CLINIC | Facility: CLINIC | Age: 35
End: 2020-10-21

## 2020-10-21 NOTE — TELEPHONE ENCOUNTER
[facility] pt[facility], we got  copy of  Result of pt's MRI thoracic spine which they requested for his routine ffup for his chronic back pain/paraplegia. Pt had been ff in [address] by neurosurgeon so should  ffup with them.  Father can  copy of resul

## 2020-10-22 NOTE — TELEPHONE ENCOUNTER
Patient's father Laalesha Maya advised of note below. Father would like Dr. Coleman Cancer advice on MRI and what it says and how it compares to last year's MRI. Patient's father would also like recommendation for different neurosurgeon.

## 2020-10-23 NOTE — TELEPHONE ENCOUNTER
I did call Gracie Dudley yesterday and we discuss the MRI result. He kvng has a copy of result as well as CD copy.   The recent mri was not compared with previous so hard to compare both based on the final read but I told Gracie Dudley about the possible difference based

## 2020-11-10 ENCOUNTER — PATIENT MESSAGE (OUTPATIENT)
Dept: INTERNAL MEDICINE CLINIC | Facility: CLINIC | Age: 35
End: 2020-11-10

## 2020-11-10 ENCOUNTER — TELEPHONE (OUTPATIENT)
Dept: INTERNAL MEDICINE CLINIC | Facility: CLINIC | Age: 35
End: 2020-11-10

## 2020-11-10 DIAGNOSIS — G89.29 CHRONIC THORACIC BACK PAIN, UNSPECIFIED BACK PAIN LATERALITY: Primary | ICD-10-CM

## 2020-11-10 DIAGNOSIS — M79.2 NEUROPATHIC PAIN: Primary | ICD-10-CM

## 2020-11-10 DIAGNOSIS — M48.04 THORACIC SPINAL STENOSIS: ICD-10-CM

## 2020-11-10 DIAGNOSIS — M54.6 CHRONIC THORACIC BACK PAIN, UNSPECIFIED BACK PAIN LATERALITY: Primary | ICD-10-CM

## 2020-11-10 NOTE — TELEPHONE ENCOUNTER
Spoke to Farshad Barlow and let him know the referral for  has been placed. He would like that sent to [facility].

## 2020-11-10 NOTE — TELEPHONE ENCOUNTER
Patient's father calling in requesting referral to see Physiatrist, they are requesting a referral to her.  They specifically wanted to see Dr. Levon Shen    Will have to state diagnose- neuropathic pain    Please inform patient when referral is ready, t

## 2020-11-10 NOTE — TELEPHONE ENCOUNTER
Dr. Bradley Martino, see message below. Referral has been pended.     Please reply to pool: EM TRIAGE SUPPORT

## 2020-11-10 NOTE — TELEPHONE ENCOUNTER
This had 2 encounters. Being address at referral encounter. From: Lucian Duckworth  To: Rubia Tamayo MD  Sent: 11/10/2020  1:41 PM CST  Subject: Referral Request    Dr. Gatito Beth, please call me. This is Preston.  I need a referral from you to

## 2020-11-17 ENCOUNTER — PATIENT MESSAGE (OUTPATIENT)
Dept: INTERNAL MEDICINE CLINIC | Facility: CLINIC | Age: 35
End: 2020-11-17

## 2020-11-17 DIAGNOSIS — Z00.00 ANNUAL PHYSICAL EXAM: Primary | ICD-10-CM

## 2020-11-18 NOTE — TELEPHONE ENCOUNTER
From: Amado Duckworth  To: Nathaniel Dejesus MD  Sent: 11/17/2020 9:50 PM CST  Subject: Visit Elizabeth Mireles,     We attended appointment. Got nowhere. I faxed 20 pages of Keyur's Images to office last week.  I asked  to rev

## 2020-11-19 NOTE — TELEPHONE ENCOUNTER
I had put in orders for labs for annual physical pt was requesting. Pt had already seen neurosurgeon at [facility] and also at [facility] in my last communication with Oneyda Toledo so may need to go back to neurosurgeon in [facility].

## 2020-11-19 NOTE — TELEPHONE ENCOUNTER
Response sent to patient stating message still requiring MD review. Dr. Albert Do patient/father requesting your assistance. [employer] message also sent stating can scheduled video/tele visit with EL to further discuss.

## 2021-08-02 ENCOUNTER — TELEPHONE (OUTPATIENT)
Dept: INTERNAL MEDICINE CLINIC | Facility: CLINIC | Age: 36
End: 2021-08-02

## 2021-08-02 NOTE — TELEPHONE ENCOUNTER
Spoke to Mariam Moran (on [employer]) , advised him of 's note below. Mariam Moran states Siva Sousa has retired and no one is at that practice any more. I called 's office  Mary STEPHENS is taking over [address] [facility] office. [phone]. Alejandra Quintanilla

## 2021-08-02 NOTE — TELEPHONE ENCOUNTER
I reviewed his chart, has already been seeing another PCP Dr Keny Yañez, since last year,  who has been prescribing pt alprazolam which is for anxiety  so should call their office.  I had not seen pt for more than 1 1/2 yrs

## 2021-08-02 NOTE — TELEPHONE ENCOUNTER
Patient's guardian Smitha Hu (on ASH) is asking if  can prescribe diazepam for patient. Smitha Hu states patient is paraplegic and is having \"one of those days\" where patient needs something to calm him down. He states alprazolam makes him too sleepy.

## 2021-08-03 NOTE — TELEPHONE ENCOUNTER
Preston (on ASH) returned call and states patient would like to re-establish care with Dr. Winifred Barahona.   Informed Joni Babin that Dr. Winifred Barahona out of office until 8/15/21. Patient scheduled to see Dr. Modesto Mercer on 8/5/21 for medication refill.

## 2021-08-05 ENCOUNTER — OFFICE VISIT (OUTPATIENT)
Dept: INTERNAL MEDICINE CLINIC | Facility: CLINIC | Age: 36
End: 2021-08-05
Payer: MEDICAID

## 2021-08-05 VITALS
BODY MASS INDEX: 23.62 KG/M2 | SYSTOLIC BLOOD PRESSURE: 110 MMHG | HEIGHT: 70 IN | DIASTOLIC BLOOD PRESSURE: 69 MMHG | TEMPERATURE: 97 F | WEIGHT: 165 LBS | HEART RATE: 69 BPM

## 2021-08-05 DIAGNOSIS — F41.9 ANXIETY: ICD-10-CM

## 2021-08-05 DIAGNOSIS — M79.2 NEUROPATHIC PAIN: Primary | ICD-10-CM

## 2021-08-05 DIAGNOSIS — G89.4 CHRONIC PAIN DISORDER: ICD-10-CM

## 2021-08-05 DIAGNOSIS — G82.20 PARAPLEGIA AT T4 LEVEL (HCC): ICD-10-CM

## 2021-08-05 PROCEDURE — 3008F BODY MASS INDEX DOCD: CPT | Performed by: INTERNAL MEDICINE

## 2021-08-05 PROCEDURE — 3078F DIAST BP <80 MM HG: CPT | Performed by: INTERNAL MEDICINE

## 2021-08-05 PROCEDURE — 99215 OFFICE O/P EST HI 40 MIN: CPT | Performed by: INTERNAL MEDICINE

## 2021-08-05 PROCEDURE — 3074F SYST BP LT 130 MM HG: CPT | Performed by: INTERNAL MEDICINE

## 2021-08-05 RX ORDER — ESCITALOPRAM OXALATE 10 MG/1
10 TABLET ORAL DAILY
Qty: 90 TABLET | Refills: 1 | Status: SHIPPED | OUTPATIENT
Start: 2021-08-05 | End: 2021-09-02

## 2021-08-05 RX ORDER — DIAZEPAM 5 MG/1
5 TABLET ORAL EVERY 12 HOURS PRN
Qty: 60 TABLET | Refills: 0 | Status: SHIPPED | OUTPATIENT
Start: 2021-08-05 | End: 2021-09-06
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
## 2021-09-02 ENCOUNTER — OFFICE VISIT (OUTPATIENT)
Dept: INTERNAL MEDICINE CLINIC | Facility: CLINIC | Age: 36
End: 2021-09-02
Payer: MEDICAID

## 2021-09-02 VITALS
TEMPERATURE: 98 F | HEART RATE: 64 BPM | HEIGHT: 70 IN | BODY MASS INDEX: 23.62 KG/M2 | DIASTOLIC BLOOD PRESSURE: 51 MMHG | WEIGHT: 165 LBS | SYSTOLIC BLOOD PRESSURE: 90 MMHG

## 2021-09-02 DIAGNOSIS — Z23 NEED FOR PNEUMOCOCCAL VACCINATION: ICD-10-CM

## 2021-09-02 DIAGNOSIS — Z00.00 ANNUAL PHYSICAL EXAM: Primary | ICD-10-CM

## 2021-09-02 DIAGNOSIS — F41.9 ANXIETY: ICD-10-CM

## 2021-09-02 PROBLEM — L89.92 DECUBITUS ULCER, STAGE 2 (HCC): Status: RESOLVED | Noted: 2017-06-29 | Resolved: 2021-09-02

## 2021-09-02 PROCEDURE — 90471 IMMUNIZATION ADMIN: CPT | Performed by: INTERNAL MEDICINE

## 2021-09-02 PROCEDURE — 90732 PPSV23 VACC 2 YRS+ SUBQ/IM: CPT | Performed by: INTERNAL MEDICINE

## 2021-09-02 PROCEDURE — 99395 PREV VISIT EST AGE 18-39: CPT | Performed by: INTERNAL MEDICINE

## 2021-09-02 PROCEDURE — 3074F SYST BP LT 130 MM HG: CPT | Performed by: INTERNAL MEDICINE

## 2021-09-02 PROCEDURE — 3008F BODY MASS INDEX DOCD: CPT | Performed by: INTERNAL MEDICINE

## 2021-09-02 PROCEDURE — 3078F DIAST BP <80 MM HG: CPT | Performed by: INTERNAL MEDICINE

## 2021-09-02 RX ORDER — ESCITALOPRAM OXALATE 20 MG/1
20 TABLET ORAL DAILY
Qty: 90 TABLET | Refills: 3 | Status: SHIPPED | OUTPATIENT
Start: 2021-09-02

## 2021-09-02 NOTE — PROGRESS NOTES
History of Present Illness   Patient ID: Eagle Gotti is a 30year old male.   Chief Complaint: Physical and Anxiety      Anshu Giovana Darden is a pleasant 30year old male who presents for annual physical exam. Eagle Gotti Pulmonary:      Effort: Pulmonary effort is normal. No respiratory distress. Breath sounds: Normal breath sounds. No wheezing. Abdominal:      General: Abdomen is flat. Bowel sounds are normal.      Tenderness: There is no abdominal tenderness. Maternal Grandfather         Lung Cancer- non smoker. Reviewed:  Past Surgical History:   Procedure Laterality Date   • OTHER SURGICAL HISTORY      T12 Spinal surgery Has stabilizor and screws placed.    • OTHER SURGICAL HISTORY      surgery for ches Follow Up:   Return for 1 MONTH FOLLOW UP, VIDEO OR OFFICE, FOR: anxiety.         Rosy Padilla MD  Internal Medicine    Medications This Visit:  Requested Prescriptions     Signed Prescriptions Disp Refills   • escitalopram 20 MG Oral Tab 90 tablet

## 2021-09-05 DIAGNOSIS — G82.20 PARAPLEGIA AT T4 LEVEL (HCC): ICD-10-CM

## 2021-09-05 DIAGNOSIS — M79.2 NEUROPATHIC PAIN: ICD-10-CM

## 2021-09-05 DIAGNOSIS — G89.4 CHRONIC PAIN DISORDER: ICD-10-CM

## 2021-09-06 NOTE — TELEPHONE ENCOUNTER
Please review. Protocol Failed or has No Protocol.     Requested Prescriptions   Pending Prescriptions Disp Refills    DIAZEPAM 5 MG Oral Tab [Pharmacy Med Name: DIAZEPAM 5MG TABLETS] 60 tablet 0     Sig: TAKE 1 TABLET(5 MG) BY MOUTH EVERY 12 HOURS AS NEEDE present

## 2021-09-07 RX ORDER — DIAZEPAM 5 MG/1
5 TABLET ORAL EVERY 12 HOURS PRN
Qty: 60 TABLET | Refills: 0 | Status: SHIPPED | OUTPATIENT
Start: 2021-09-07 | End: 2021-10-04

## 2021-09-30 ENCOUNTER — LAB ENCOUNTER (OUTPATIENT)
Dept: LAB | Age: 36
End: 2021-09-30
Attending: INTERNAL MEDICINE
Payer: MEDICAID

## 2021-09-30 DIAGNOSIS — Z00.00 ANNUAL PHYSICAL EXAM: ICD-10-CM

## 2021-09-30 PROCEDURE — 84443 ASSAY THYROID STIM HORMONE: CPT

## 2021-09-30 PROCEDURE — 36415 COLL VENOUS BLD VENIPUNCTURE: CPT

## 2021-09-30 PROCEDURE — 80061 LIPID PANEL: CPT

## 2021-09-30 PROCEDURE — 80053 COMPREHEN METABOLIC PANEL: CPT

## 2021-09-30 PROCEDURE — 85027 COMPLETE CBC AUTOMATED: CPT

## 2021-09-30 PROCEDURE — 83036 HEMOGLOBIN GLYCOSYLATED A1C: CPT

## 2021-10-04 NOTE — PROGRESS NOTES
History of Present Illness   Patient ID: Linford Dakins is a 30year old male. Today's Date: 10/04/21  Chief Complaint: Anxiety      HPI   1.   He is anxiety related to his paraplegia along with stresses related to finances and money being st jaundiced. Neurological:      General: No focal deficit present. Mental Status: He is alert and oriented to person, place, and time. Mental status is at baseline.    Psychiatric:         Mood and Affect: Mood normal.         Behavior: Behavior normal Follow Up: As needed/if symptoms worsen or Return for 3 MONTH FOLLOW UP, VIDEO OR OFFICE, FOR: routine. .         Labs/imaging, medical/social history  Pertinent labs and imaging reviewed in [facility] EMR.      Reviewed Active Problems:  Patient Active Prob

## 2021-11-12 ENCOUNTER — TELEPHONE (OUTPATIENT)
Dept: INTERNAL MEDICINE CLINIC | Facility: CLINIC | Age: 36
End: 2021-11-12

## 2021-11-12 NOTE — TELEPHONE ENCOUNTER
Patient is requesting a refill. Current Outpatient Medications   Medication Sig Dispense Refill   • diazePAM 5 MG Oral Tab Take 1 tablet (5 mg total) by mouth every 12 (twelve) hours as needed for Anxiety. AND PARAPLEGIC SPASM.  60 tablet 1

## 2021-11-12 NOTE — TELEPHONE ENCOUNTER
Informed Otilia Díaz that pt has a refill on file and I confirmed with Pharmacy. Parish verbalized understanding and he said he was the one that called earlier.

## 2021-12-03 ENCOUNTER — MED REC SCAN ONLY (OUTPATIENT)
Dept: INTERNAL MEDICINE CLINIC | Facility: CLINIC | Age: 36
End: 2021-12-03

## 2021-12-13 DIAGNOSIS — M79.2 NEUROPATHIC PAIN: ICD-10-CM

## 2021-12-13 DIAGNOSIS — G89.4 CHRONIC PAIN DISORDER: ICD-10-CM

## 2021-12-13 DIAGNOSIS — G82.20 PARAPLEGIA AT T4 LEVEL (HCC): ICD-10-CM

## 2021-12-13 RX ORDER — DIAZEPAM 5 MG/1
5 TABLET ORAL EVERY 12 HOURS PRN
Qty: 60 TABLET | Refills: 1 | Status: SHIPPED | OUTPATIENT
Start: 2021-12-13 | End: 2022-01-04

## 2021-12-13 NOTE — TELEPHONE ENCOUNTER
Patient is requesting a refill for medication below and states out of medication    •  diazePAM 5 MG Oral Tab, Take 1 tablet (5 mg total) by mouth every 12 (twelve) hours as needed for Anxiety.  AND PARAPLEGIC SPASM., Disp: 60 tablet, Rfl: 1

## 2021-12-13 NOTE — TELEPHONE ENCOUNTER
Please review; protocol failed/no protocol    Requested Prescriptions   Pending Prescriptions Disp Refills    diazePAM 5 MG Oral Tab 60 tablet 1     Sig: Take 1 tablet (5 mg total) by mouth every 12 (twelve) hours as needed for Anxiety.  AND PARAPLEGIC SPAS

## 2021-12-14 NOTE — TELEPHONE ENCOUNTER
Patient called to ask for status of refill. Relayed that medication was sent yesterday around 4:00 PM. He will check with pharmacy and call back with any problems.

## 2022-01-04 ENCOUNTER — TELEPHONE (OUTPATIENT)
Dept: INTERNAL MEDICINE CLINIC | Facility: CLINIC | Age: 37
End: 2022-01-04

## 2022-01-04 ENCOUNTER — OFFICE VISIT (OUTPATIENT)
Dept: INTERNAL MEDICINE CLINIC | Facility: CLINIC | Age: 37
End: 2022-01-04
Payer: MEDICAID

## 2022-01-04 VITALS
RESPIRATION RATE: 18 BRPM | SYSTOLIC BLOOD PRESSURE: 110 MMHG | WEIGHT: 165 LBS | DIASTOLIC BLOOD PRESSURE: 65 MMHG | HEART RATE: 71 BPM | HEIGHT: 70 IN | BODY MASS INDEX: 23.62 KG/M2

## 2022-01-04 DIAGNOSIS — Z98.1 HISTORY OF SPINAL FUSION: ICD-10-CM

## 2022-01-04 DIAGNOSIS — K21.9 GASTROESOPHAGEAL REFLUX DISEASE WITHOUT ESOPHAGITIS: ICD-10-CM

## 2022-01-04 DIAGNOSIS — G89.4 CHRONIC PAIN DISORDER: ICD-10-CM

## 2022-01-04 DIAGNOSIS — Z87.828 HISTORY OF SPINAL CORD INJURY: ICD-10-CM

## 2022-01-04 DIAGNOSIS — M79.2 NEUROPATHIC PAIN: ICD-10-CM

## 2022-01-04 DIAGNOSIS — G82.20 PARAPLEGIA AT T4 LEVEL (HCC): Primary | ICD-10-CM

## 2022-01-04 PROCEDURE — 3074F SYST BP LT 130 MM HG: CPT | Performed by: INTERNAL MEDICINE

## 2022-01-04 PROCEDURE — 3078F DIAST BP <80 MM HG: CPT | Performed by: INTERNAL MEDICINE

## 2022-01-04 PROCEDURE — 99213 OFFICE O/P EST LOW 20 MIN: CPT | Performed by: INTERNAL MEDICINE

## 2022-01-04 PROCEDURE — 3008F BODY MASS INDEX DOCD: CPT | Performed by: INTERNAL MEDICINE

## 2022-01-04 RX ORDER — OMEPRAZOLE 40 MG/1
40 CAPSULE, DELAYED RELEASE ORAL DAILY
Qty: 90 CAPSULE | Refills: 3 | Status: SHIPPED | OUTPATIENT
Start: 2022-01-04 | End: 2022-12-30

## 2022-01-04 RX ORDER — DIAZEPAM 5 MG/1
5 TABLET ORAL EVERY 12 HOURS PRN
Qty: 60 TABLET | Refills: 1 | Status: SHIPPED | OUTPATIENT
Start: 2022-01-04

## 2022-01-04 NOTE — TELEPHONE ENCOUNTER
diazePAM 5 MG Oral Tab, Take 1 tablet (5 mg total) by mouth every 12 (twelve) hours as needed for Anxiety.  AND PARAPLEGIC   SPASM., Disp: 60 tablet, Rfl: 1    Key: [id number]   Patient Last Name: Toyin Ashraf  : 1985

## 2022-01-04 NOTE — PROGRESS NOTES
History of Present Illness   Patient ID: Wagner Beatty is a 29year old male.   Today's Date: 01/04/22  Chief Complaint: Medication Request (pain meds)      HPI  Very pleasant 29-year-old gentleman with a history of T4 paraplegia with chronic normal.   Musculoskeletal:      Cervical back: Normal range of motion and neck supple. Comments: paraplegia   Skin:     Coloration: Skin is not jaundiced. Neurological:      General: No focal deficit present.       Mental Status: He is alert and orie Future; Expected date: 01/04/2022  -     MRI CERVICAL+THORACIC+LUMBAR SPINE  (CPT=72141/00851/81924)  3. History of spinal cord injury  -     Cancel: MRI CERVICAL+THORACIC+LUMBAR SPINE  (CPT=72141/12739/15458);  Future; Expected date: 01/04/2022  -     MRI Current Every Day Smoker        Packs/day: 1.00      Smokeless tobacco: Never Used      Tobacco comment: 10 cigs    Vaping Use      Vaping Use: Never used    Alcohol use: No    Drug use: Yes      Types: Cannabis      Comment: Medical marijuana evryday

## 2022-01-04 NOTE — PATIENT INSTRUCTIONS
1.  I will order an MRI of your cervical thoracic and lumbar spine, check with the insurance as they are likely to cover this but if not then I would like you to make the trip to [facility] as they have a strong 3 Leslie magnet and you can pay cash jose antonio

## 2022-01-06 NOTE — TELEPHONE ENCOUNTER
Prior authorization for diazepam has been denied. Please refer to denial letter faxed to the office     Denied    Details of this decision are provided on the physician outcome notice which has been faxed to the number on file.    Case ID: [id number]

## 2022-01-07 NOTE — TELEPHONE ENCOUNTER
Spoke to patient and informed him that if he still wants to take it he will need to pay for it out of pocket

## 2022-01-31 ENCOUNTER — TELEPHONE (OUTPATIENT)
Dept: INTERNAL MEDICINE CLINIC | Facility: CLINIC | Age: 37
End: 2022-01-31

## 2022-01-31 NOTE — TELEPHONE ENCOUNTER
[facility] calling requesting if we can fax over pts MRI order for cervical, thoracic and spine  Fax#: [phone]

## 2022-02-08 ENCOUNTER — TELEPHONE (OUTPATIENT)
Dept: INTERNAL MEDICINE CLINIC | Facility: CLINIC | Age: 37
End: 2022-02-08

## 2022-02-08 NOTE — TELEPHONE ENCOUNTER
diazePAM 5 MG Oral Tab, Take 1 tablet (5 mg total) by mouth every 12 (twelve) hours as needed for Anxiety.  AND PARAPLEGIC SPASM., Disp: 60 tablet, Rfl: 1    Key: [id number]  Patient Last Name: Alberto Resendiz  : 1985

## 2022-03-09 RX ORDER — DIAZEPAM 5 MG/1
5 TABLET ORAL EVERY 12 HOURS PRN
Qty: 60 TABLET | Refills: 0 | Status: SHIPPED | OUTPATIENT
Start: 2022-03-09

## 2022-03-09 NOTE — TELEPHONE ENCOUNTER
Please review; protocol failed/no protocol    Requested Prescriptions   Pending Prescriptions Disp Refills    DIAZEPAM 5 MG Oral Tab [Pharmacy Med Name: DIAZEPAM 5MG TABLETS] 60 tablet 0     Sig: TAKE 1 TABLET BY MOUTH EVERY 12 HOURS AS NEEDED FOR ANXIETY AND PARAPLEGIC SPASM        There is no refill protocol information for this order            Recent Outpatient Visits              2 months ago Paraplegia at T4 level [facility])    Fabiola Patrick MD    Office Visit    5 months ago Fabiola Quispe MD    Office Visit    6 months ago Annual physical exam    Fabiola Patrick MD    Office Visit    7 months ago Neuropathic pain    Fabiola Patrick MD    Office Visit    9 months ago Encounter for vaccination    Piyush Lorenz, [address]    Nurse Only

## 2022-03-14 ENCOUNTER — TELEPHONE (OUTPATIENT)
Dept: INTERNAL MEDICINE CLINIC | Facility: CLINIC | Age: 37
End: 2022-03-14

## 2022-03-14 ENCOUNTER — TELEPHONE (OUTPATIENT)
Dept: CASE MANAGEMENT | Age: 37
End: 2022-03-14

## 2022-03-14 NOTE — TELEPHONE ENCOUNTER
Patient is requesting a referral to dermatology from Dr. Db Clark. [facility] in [place].        Ph [phone]

## 2022-03-14 NOTE — TELEPHONE ENCOUNTER
Dr. Ventura File,    The patient has [facility] and is requesting a dermatology referral for MATHIEU Marquez at [facility], patient did not provide DX. Pended referral please review diagnosis and sign off if you agree. Thank you.   Abhishek Fleming

## 2022-03-15 NOTE — TELEPHONE ENCOUNTER
I cant sign a referral if I don't have a diagnosis, this should have been provided while patient is on the phone or explained to patient I can't give a referral without a diagnosis. Please call patient.

## 2022-03-16 NOTE — TELEPHONE ENCOUNTER
Dr. Harper Cough,    I reached out to the patient again, and he did provide me with a DX. He wants a full body check, general exam no problems. Thank you.   Referral Department

## 2022-04-07 ENCOUNTER — MED REC SCAN ONLY (OUTPATIENT)
Dept: INTERNAL MEDICINE CLINIC | Facility: CLINIC | Age: 37
End: 2022-04-07

## 2022-04-07 ENCOUNTER — TELEPHONE (OUTPATIENT)
Dept: INTERNAL MEDICINE CLINIC | Facility: CLINIC | Age: 37
End: 2022-04-07

## 2022-04-07 DIAGNOSIS — Z87.828 HISTORY OF SPINAL CORD INJURY: ICD-10-CM

## 2022-04-07 DIAGNOSIS — G82.20 PARAPLEGIA (HCC): Primary | ICD-10-CM

## 2022-04-07 DIAGNOSIS — Z98.1 S/P SPINAL FUSION: ICD-10-CM

## 2022-04-07 NOTE — TELEPHONE ENCOUNTER
REFUGIOM for nereyda at . [address] , pcp is dr. Nerissa Pate not sure where they faxed but our office fax is [phone]

## 2022-04-07 NOTE — TELEPHONE ENCOUNTER
[facility] called to follow up on form that was faxed to dr Beth Figueroa office on 4/4 for DME for wheelchair. Please complete and fax paperwork to the company below.      [facility]  Fax [phone]

## 2022-04-07 NOTE — TELEPHONE ENCOUNTER
Spoke to [place] to verify with patient or family regarding PCP. Never seen by   PCP on file is .   Routing to [facility] dept

## 2022-04-11 NOTE — TELEPHONE ENCOUNTER
Spoke to [place] from [place]. She said that [facility] sent fax back because DMR was missing Dr. Anant Escobar first name and NPI number.  [facility] staff, please watch for fax from [facility], update form and fax back as soon as possible. Thank you,     Routing to  [facility] clinical staff.

## 2022-04-12 ENCOUNTER — TELEPHONE (OUTPATIENT)
Dept: INTERNAL MEDICINE CLINIC | Facility: CLINIC | Age: 37
End: 2022-04-12

## 2022-04-12 RX ORDER — DIAZEPAM 5 MG/1
5 TABLET ORAL EVERY 12 HOURS PRN
Qty: 60 TABLET | Refills: 2 | Status: SHIPPED | OUTPATIENT
Start: 2022-04-12

## 2022-04-12 NOTE — TELEPHONE ENCOUNTER
Procedure: Spinal Cord Stimulator Implant  Date of sx/site: 6/1/22 - [facility]    All test to be done 30 days before sx date  CMP  CBC w Diff  PT,INR,PTT  Urinalysis  EKG  Chest xray    Dr. Najma Pagan can you please place orders, will then call pt to complete within time frame and schedule preop appt

## 2022-04-12 NOTE — TELEPHONE ENCOUNTER
Noted, spoke with baldomero (brother) with Jesus Santos present in background  Relayed message below, have also written instructions for pre-op orders, dr Xiomara Solomon instructions as pt requested ready to be picked up at [facility] location 2nd floor . Advised Pt to signed new updated [facility] form when they come to the office  Pt and brother verbalized understanding.  Pre-op appt scheduled 5/16/22

## 2022-04-12 NOTE — TELEPHONE ENCOUNTER
Noted, thanks. Labs ordered  1. Blood work to be done at [facility], does not need to fast  2.   Chest x-ray, EKG, MRSA screen to be done at any [facility] lab

## 2022-04-12 NOTE — TELEPHONE ENCOUNTER
Thank you!       Also pt requesting for refill   diazePAM 5 MG Oral Tab if able can send to [facility] pharmacy on file

## 2022-04-14 NOTE — TELEPHONE ENCOUNTER
Never receieved fax back from [facility]. Wheelchair order that was signed on 4/7/22 faxed to [phone].  Fax confirmation received

## 2022-04-14 NOTE — TELEPHONE ENCOUNTER
Shavon from [employer] states the durable medical supply company [employer] still needs a signed script from [facility] with order for the wheelchair    BV :  Fax number:[phone]  Phone number [phone]

## 2022-04-15 NOTE — TELEPHONE ENCOUNTER
Nohemy with [employer] calling to inform Dr. Noah Yanez she will be re faxing the form in regards to wheelchair for this patient. Dr. Noah Yanez needs to sign, and refax with Dr. Crespo Folk information.       # [phone]     Fax# [phone]

## 2022-04-20 NOTE — TELEPHONE ENCOUNTER
Per Rajat Montesinos with [facility] medical supply, the order they received is for a custom wheelchair and they are not able to provide customs wheelchairs. Rajat Montesinos spoke with patient's family who mentioned they do not need a custom wheelchair. Rajat Montesinos is requesting an updated order.  Please advise

## 2022-04-26 NOTE — TELEPHONE ENCOUNTER
Just received new questionnaire. Dr Berna Hinojosa out of office and will fill out upon return May 2nd.   Once completed will fax back to [facility]

## 2022-04-26 NOTE — TELEPHONE ENCOUNTER
Ellie Pearson following up on call. States DME company still has not received updated order form for wheelchair. Informed Ellie Pearson, revised order was faxed on 4/20/22. Called [facility], spoke with [place]. Informed her revised form was faxed back on 4/20/22. Per Ted Godoy, they only received clinical notes and order. They need the questionnaire that was faxed to be revised that will state the Pt does not need a custom wheelchair. Per Ted Godoy, the questionnaire was not updated and 'custom wheelchair' is still circled. Clinical staff, please review questionnaire and ensure 'custom wheelchair' is not selected. I asked Ted Godoy to fax a new questionnaire if our clinical staff if not able to locate current one. Please fax back to [facility].

## 2022-05-10 ENCOUNTER — TELEPHONE (OUTPATIENT)
Dept: INTERNAL MEDICINE CLINIC | Facility: CLINIC | Age: 37
End: 2022-05-10

## 2022-05-10 NOTE — TELEPHONE ENCOUNTER
Call transferred from phone room. Patient asking why Diazepam was not approved. Advised patient usually means the patient's insurance will not cover the cost. Patient asking if he can pay out of pocket. Advised patient, per 2/9/22 documentation from [facility] patient,\" will need to pay for it out of pocket. \" and that the pharmacy was notified to keep note on file.

## 2022-05-12 ENCOUNTER — HOSPITAL ENCOUNTER (OUTPATIENT)
Dept: GENERAL RADIOLOGY | Age: 37
Discharge: HOME | End: 2022-05-12
Attending: INTERNAL MEDICINE
Payer: MEDICAID

## 2022-05-12 ENCOUNTER — EKG ENCOUNTER (OUTPATIENT)
Dept: LAB | Age: 37
End: 2022-05-12
Attending: INTERNAL MEDICINE
Payer: MEDICAID

## 2022-05-12 ENCOUNTER — LAB ENCOUNTER (OUTPATIENT)
Dept: LAB | Age: 37
End: 2022-05-12
Attending: INTERNAL MEDICINE
Payer: MEDICAID

## 2022-05-12 DIAGNOSIS — Z01.818 PREOPERATIVE EXAMINATION: ICD-10-CM

## 2022-05-12 LAB
ALBUMIN SERPL-MCNC: 3.9 G/DL (ref 3.4–5)
ALBUMIN/GLOB SERPL: 1 {RATIO} (ref 1–2)
ALP LIVER SERPL-CCNC: 111 U/L
ALT SERPL-CCNC: 39 U/L
ANION GAP SERPL CALC-SCNC: 4 MMOL/L (ref 0–18)
APTT PPP: 33.1 SECONDS (ref 23.3–35.6)
AST SERPL-CCNC: 16 U/L (ref 15–37)
BASOPHILS # BLD AUTO: 0.05 X10(3) UL (ref 0–0.2)
BASOPHILS NFR BLD AUTO: 0.4 %
BILIRUB SERPL-MCNC: 0.3 MG/DL (ref 0.1–2)
BUN BLD-MCNC: 14 MG/DL (ref 7–18)
BUN/CREAT SERPL: 26.4 (ref 10–20)
CALCIUM BLD-MCNC: 9.3 MG/DL (ref 8.5–10.1)
CHLORIDE SERPL-SCNC: 102 MMOL/L (ref 98–112)
CO2 SERPL-SCNC: 32 MMOL/L (ref 21–32)
CREAT BLD-MCNC: 0.53 MG/DL
DEPRECATED RDW RBC AUTO: 46.4 FL (ref 35.1–46.3)
EOSINOPHIL # BLD AUTO: 0.79 X10(3) UL (ref 0–0.7)
EOSINOPHIL NFR BLD AUTO: 6.5 %
ERYTHROCYTE [DISTWIDTH] IN BLOOD BY AUTOMATED COUNT: 13.8 % (ref 11–15)
FASTING STATUS PATIENT QL REPORTED: NO
GLOBULIN PLAS-MCNC: 4.1 G/DL (ref 2.8–4.4)
GLUCOSE BLD-MCNC: 80 MG/DL (ref 70–99)
HCT VFR BLD AUTO: 41.9 %
HGB BLD-MCNC: 13.2 G/DL
IMM GRANULOCYTES # BLD AUTO: 0.04 X10(3) UL (ref 0–1)
IMM GRANULOCYTES NFR BLD: 0.3 %
INR BLD: 0.94 (ref 0.8–1.2)
LYMPHOCYTES # BLD AUTO: 2.27 X10(3) UL (ref 1–4)
LYMPHOCYTES NFR BLD AUTO: 18.6 %
MCH RBC QN AUTO: 28.9 PG (ref 26–34)
MCHC RBC AUTO-ENTMCNC: 31.5 G/DL (ref 31–37)
MCV RBC AUTO: 91.7 FL
MONOCYTES # BLD AUTO: 0.92 X10(3) UL (ref 0.1–1)
MONOCYTES NFR BLD AUTO: 7.6 %
NEUTROPHILS # BLD AUTO: 8.11 X10 (3) UL (ref 1.5–7.7)
NEUTROPHILS # BLD AUTO: 8.11 X10(3) UL (ref 1.5–7.7)
NEUTROPHILS NFR BLD AUTO: 66.6 %
OSMOLALITY SERPL CALC.SUM OF ELEC: 285 MOSM/KG (ref 275–295)
PLATELET # BLD AUTO: 318 10(3)UL (ref 150–450)
POTASSIUM SERPL-SCNC: 4 MMOL/L (ref 3.5–5.1)
PROT SERPL-MCNC: 8 G/DL (ref 6.4–8.2)
PROTHROMBIN TIME: 12.6 SECONDS (ref 11.6–14.8)
RBC # BLD AUTO: 4.57 X10(6)UL
SODIUM SERPL-SCNC: 138 MMOL/L (ref 136–145)
WBC # BLD AUTO: 12.2 X10(3) UL (ref 4–11)

## 2022-05-12 PROCEDURE — 80053 COMPREHEN METABOLIC PANEL: CPT | Performed by: INTERNAL MEDICINE

## 2022-05-12 PROCEDURE — 71046 X-RAY EXAM CHEST 2 VIEWS: CPT | Performed by: INTERNAL MEDICINE

## 2022-05-12 PROCEDURE — 81001 URINALYSIS AUTO W/SCOPE: CPT | Performed by: INTERNAL MEDICINE

## 2022-05-12 PROCEDURE — 93010 ELECTROCARDIOGRAM REPORT: CPT | Performed by: INTERNAL MEDICINE

## 2022-05-12 PROCEDURE — 93005 ELECTROCARDIOGRAM TRACING: CPT

## 2022-05-12 PROCEDURE — 87086 URINE CULTURE/COLONY COUNT: CPT | Performed by: INTERNAL MEDICINE

## 2022-05-12 PROCEDURE — 85610 PROTHROMBIN TIME: CPT

## 2022-05-12 PROCEDURE — 87186 SC STD MICRODIL/AGAR DIL: CPT | Performed by: INTERNAL MEDICINE

## 2022-05-12 PROCEDURE — 85730 THROMBOPLASTIN TIME PARTIAL: CPT

## 2022-05-12 PROCEDURE — 36415 COLL VENOUS BLD VENIPUNCTURE: CPT | Performed by: INTERNAL MEDICINE

## 2022-05-12 PROCEDURE — 85025 COMPLETE CBC W/AUTO DIFF WBC: CPT | Performed by: INTERNAL MEDICINE

## 2022-05-12 PROCEDURE — 87088 URINE BACTERIA CULTURE: CPT | Performed by: INTERNAL MEDICINE

## 2022-05-12 PROCEDURE — 87077 CULTURE AEROBIC IDENTIFY: CPT | Performed by: INTERNAL MEDICINE

## 2022-05-16 ENCOUNTER — OFFICE VISIT (OUTPATIENT)
Dept: INTERNAL MEDICINE CLINIC | Facility: CLINIC | Age: 37
End: 2022-05-16
Payer: MEDICAID

## 2022-05-16 VITALS
BODY MASS INDEX: 23.62 KG/M2 | SYSTOLIC BLOOD PRESSURE: 119 MMHG | HEART RATE: 86 BPM | HEIGHT: 70 IN | WEIGHT: 165 LBS | DIASTOLIC BLOOD PRESSURE: 64 MMHG

## 2022-05-16 DIAGNOSIS — N30.01 ACUTE CYSTITIS WITH HEMATURIA: ICD-10-CM

## 2022-05-16 DIAGNOSIS — G43.009 MIGRAINE WITHOUT AURA AND WITHOUT STATUS MIGRAINOSUS, NOT INTRACTABLE: ICD-10-CM

## 2022-05-16 DIAGNOSIS — M79.2 NEUROPATHIC PAIN: ICD-10-CM

## 2022-05-16 DIAGNOSIS — Z87.828 HISTORY OF SPINAL CORD INJURY: ICD-10-CM

## 2022-05-16 DIAGNOSIS — F17.200 CURRENT SMOKER: ICD-10-CM

## 2022-05-16 DIAGNOSIS — G89.4 CHRONIC PAIN DISORDER: ICD-10-CM

## 2022-05-16 DIAGNOSIS — G82.20 PARAPLEGIA AT T4 LEVEL (HCC): ICD-10-CM

## 2022-05-16 DIAGNOSIS — Z01.818 PRE-OPERATIVE EXAMINATION: Primary | ICD-10-CM

## 2022-05-16 DIAGNOSIS — N31.9 NEUROGENIC BLADDER: ICD-10-CM

## 2022-05-16 PROBLEM — R51.9 CHRONIC NONINTRACTABLE HEADACHE: Status: RESOLVED | Noted: 2018-08-09 | Resolved: 2022-05-16

## 2022-05-16 PROBLEM — G89.29 CHRONIC NONINTRACTABLE HEADACHE: Status: RESOLVED | Noted: 2018-08-09 | Resolved: 2022-05-16

## 2022-05-16 PROCEDURE — 3078F DIAST BP <80 MM HG: CPT | Performed by: INTERNAL MEDICINE

## 2022-05-16 PROCEDURE — 99215 OFFICE O/P EST HI 40 MIN: CPT | Performed by: INTERNAL MEDICINE

## 2022-05-16 PROCEDURE — 3074F SYST BP LT 130 MM HG: CPT | Performed by: INTERNAL MEDICINE

## 2022-05-16 PROCEDURE — 3008F BODY MASS INDEX DOCD: CPT | Performed by: INTERNAL MEDICINE

## 2022-05-16 RX ORDER — SULFAMETHOXAZOLE AND TRIMETHOPRIM 800; 160 MG/1; MG/1
1 TABLET ORAL 2 TIMES DAILY
Qty: 14 TABLET | Refills: 0 | Status: SHIPPED | OUTPATIENT
Start: 2022-05-16 | End: 2022-05-23

## 2022-05-16 RX ORDER — RIZATRIPTAN BENZOATE 5 MG/1
5 TABLET, ORALLY DISINTEGRATING ORAL EVERY 2 HOUR PRN
Qty: 12 TABLET | Refills: 3 | Status: SHIPPED | OUTPATIENT
Start: 2022-05-16

## 2022-05-16 NOTE — PATIENT INSTRUCTIONS
Please message your urologist and let them know that I have started you on Bactrim double strength 1 tablet twice daily for the next 7 days, you have greater than 100,000 and E. coli with pan sensitivity except for ampicillin and ampicillin sulbactam.  If they would like to change the medication that would be fine, just let me know. Once you complete the antibiotic please repeat your urinalysis so we can prove that the infection is gone, I will place an order for you.

## 2022-05-19 ENCOUNTER — TELEPHONE (OUTPATIENT)
Dept: INTERNAL MEDICINE CLINIC | Facility: CLINIC | Age: 37
End: 2022-05-19

## 2022-05-19 NOTE — TELEPHONE ENCOUNTER
[facility] and faxed to [phone].  Fax confirmation received and sent to scan

## 2022-05-25 ENCOUNTER — TELEPHONE (OUTPATIENT)
Dept: INTERNAL MEDICINE CLINIC | Facility: CLINIC | Age: 37
End: 2022-05-25

## 2022-05-25 ENCOUNTER — LAB ENCOUNTER (OUTPATIENT)
Dept: LAB | Age: 37
End: 2022-05-25
Attending: INTERNAL MEDICINE
Payer: MEDICAID

## 2022-05-25 LAB
BILIRUB UR QL: NEGATIVE
COLOR UR: YELLOW
GLUCOSE UR-MCNC: NEGATIVE MG/DL
HGB UR QL STRIP.AUTO: NEGATIVE
HYALINE CASTS #/AREA URNS AUTO: PRESENT /LPF
KETONES UR-MCNC: NEGATIVE MG/DL
NITRITE UR QL STRIP.AUTO: NEGATIVE
PH UR: 5 [PH] (ref 5–8)
PROT UR-MCNC: 30 MG/DL
SP GR UR STRIP: 1.03 (ref 1–1.03)
UROBILINOGEN UR STRIP-ACNC: 2
VIT C UR-MCNC: NEGATIVE MG/DL

## 2022-05-25 PROCEDURE — 81001 URINALYSIS AUTO W/SCOPE: CPT | Performed by: INTERNAL MEDICINE

## 2022-05-25 PROCEDURE — 87086 URINE CULTURE/COLONY COUNT: CPT | Performed by: INTERNAL MEDICINE

## 2022-05-25 NOTE — TELEPHONE ENCOUNTER
Barbi Dinero, states that the patient is having surgery on 6-1-22 and they would like pre op clearance, labs and EKG faxed to them. Please fax to : [phone].

## 2022-05-25 NOTE — TELEPHONE ENCOUNTER
I am waiting for repeat urinalysis with patient has submitted today. If there is no evidence of infection we will clear him.

## 2022-05-27 NOTE — TELEPHONE ENCOUNTER
Gentry with [facility] is following up. Please advise. Patient surgery is scheduled for 6/1. Please fax labs, EKG and clearance to [phone].

## 2022-06-13 ENCOUNTER — NURSE TRIAGE (OUTPATIENT)
Dept: INTERNAL MEDICINE CLINIC | Facility: CLINIC | Age: 37
End: 2022-06-13

## 2022-06-16 ENCOUNTER — OFFICE VISIT (OUTPATIENT)
Dept: INTERNAL MEDICINE CLINIC | Facility: CLINIC | Age: 37
End: 2022-06-16
Payer: MEDICAID

## 2022-06-16 VITALS
HEIGHT: 70 IN | BODY MASS INDEX: 23.62 KG/M2 | DIASTOLIC BLOOD PRESSURE: 79 MMHG | SYSTOLIC BLOOD PRESSURE: 118 MMHG | WEIGHT: 165 LBS | HEART RATE: 81 BPM

## 2022-06-16 DIAGNOSIS — J01.10 ACUTE FRONTAL SINUSITIS, RECURRENCE NOT SPECIFIED: Primary | ICD-10-CM

## 2022-06-16 PROCEDURE — 3074F SYST BP LT 130 MM HG: CPT | Performed by: INTERNAL MEDICINE

## 2022-06-16 PROCEDURE — 99213 OFFICE O/P EST LOW 20 MIN: CPT | Performed by: INTERNAL MEDICINE

## 2022-06-16 PROCEDURE — 3008F BODY MASS INDEX DOCD: CPT | Performed by: INTERNAL MEDICINE

## 2022-06-16 PROCEDURE — 3078F DIAST BP <80 MM HG: CPT | Performed by: INTERNAL MEDICINE

## 2022-06-16 RX ORDER — FLUTICASONE PROPIONATE 50 MCG
2 SPRAY, SUSPENSION (ML) NASAL DAILY
Qty: 3 EACH | Refills: 3 | Status: SHIPPED | OUTPATIENT
Start: 2022-06-16 | End: 2023-06-11

## 2022-06-16 RX ORDER — AMOXICILLIN AND CLAVULANATE POTASSIUM 875; 125 MG/1; MG/1
1 TABLET, FILM COATED ORAL 2 TIMES DAILY
Qty: 20 TABLET | Refills: 0 | Status: SHIPPED | OUTPATIENT
Start: 2022-06-16 | End: 2022-06-26

## 2022-06-16 RX ORDER — AZELASTINE HYDROCHLORIDE 137 UG/1
1 SPRAY, METERED NASAL 2 TIMES DAILY
Qty: 30 ML | Refills: 3 | Status: SHIPPED | OUTPATIENT
Start: 2022-06-16

## 2022-06-16 RX ORDER — FEXOFENADINE HCL 180 MG/1
180 TABLET ORAL NIGHTLY
Qty: 90 TABLET | Refills: 3 | Status: SHIPPED | OUTPATIENT
Start: 2022-06-16

## 2022-06-17 ENCOUNTER — TELEPHONE (OUTPATIENT)
Dept: INTERNAL MEDICINE CLINIC | Facility: CLINIC | Age: 37
End: 2022-06-17

## 2022-06-17 NOTE — TELEPHONE ENCOUNTER
Eleno Rhodes, physical therapist at [facility] calling with condition update on patient. She states that patient presented today for wheelchair evaluation and completed the PHQ9 form for depression screen. She states that patient answered almost every one of the questions at the highest rating and for the question that asks about how many days a week he feels \"better off not living,\" patient rated it at \"several days a week. \"    She states that she spoke to him about this and he acknowledged that he has depression and is being treated for it. She states that she offered that he go to the emergency room and patient declined. She states she would like a message sent to Dr. Treasure Castro as an "[facility]". Patient's last office visit with Dr. Treasure Catsro was yesterday 06/16/22.

## 2022-07-11 DIAGNOSIS — M79.2 NEUROPATHIC PAIN: ICD-10-CM

## 2022-07-11 DIAGNOSIS — G82.20 PARAPLEGIA AT T4 LEVEL (HCC): ICD-10-CM

## 2022-07-11 DIAGNOSIS — G89.4 CHRONIC PAIN DISORDER: ICD-10-CM

## 2022-07-11 NOTE — TELEPHONE ENCOUNTER
Patient is requesting Diazepam 5 mg be sent to [facility] in [address] [place] [address] (on file) urgently as he is out of medication.

## 2022-07-12 RX ORDER — DIAZEPAM 5 MG/1
5 TABLET ORAL EVERY 12 HOURS PRN
Qty: 60 TABLET | Refills: 2 | Status: SHIPPED | OUTPATIENT
Start: 2022-07-12

## 2022-07-13 ENCOUNTER — TELEPHONE (OUTPATIENT)
Dept: INTERNAL MEDICINE CLINIC | Facility: CLINIC | Age: 37
End: 2022-07-13

## 2022-07-21 ENCOUNTER — NURSE TRIAGE (OUTPATIENT)
Dept: INTERNAL MEDICINE CLINIC | Facility: CLINIC | Age: 37
End: 2022-07-21

## 2022-07-21 ENCOUNTER — OFFICE VISIT (OUTPATIENT)
Dept: INTERNAL MEDICINE CLINIC | Facility: CLINIC | Age: 37
End: 2022-07-21
Payer: MEDICAID

## 2022-07-21 VITALS — DIASTOLIC BLOOD PRESSURE: 77 MMHG | SYSTOLIC BLOOD PRESSURE: 135 MMHG | HEART RATE: 75 BPM

## 2022-07-21 DIAGNOSIS — R19.5 LOOSE STOOLS: Primary | ICD-10-CM

## 2022-07-21 DIAGNOSIS — K62.89 DECREASED RECTAL SPHINCTER TONE: ICD-10-CM

## 2022-07-21 DIAGNOSIS — Z87.828 HISTORY OF SPINAL CORD INJURY: ICD-10-CM

## 2022-07-21 PROCEDURE — 3075F SYST BP GE 130 - 139MM HG: CPT | Performed by: INTERNAL MEDICINE

## 2022-07-21 PROCEDURE — 99214 OFFICE O/P EST MOD 30 MIN: CPT | Performed by: INTERNAL MEDICINE

## 2022-07-21 PROCEDURE — 3078F DIAST BP <80 MM HG: CPT | Performed by: INTERNAL MEDICINE

## 2022-07-21 NOTE — PATIENT INSTRUCTIONS
I am unsure why you are having worsening of your loose stools. It is possible the antibiotics we gave for the urinary tract infection and your sinus infection could have disrupted your gut libertad and caused a shift in the balance of good and bad bacteria. I would like for you to submit a stool sample if able to check for any infection. You can start using some Imodium to try to slow down the stools but I would prefer that you hold on doing this until we can prove there is no infection. If you develop any other symptoms please let me know right away. I will order an MRI of your lumbar spine to further evaluate your symptoms.

## 2022-07-26 ENCOUNTER — APPOINTMENT (OUTPATIENT)
Dept: LAB | Age: 37
End: 2022-07-26
Attending: INTERNAL MEDICINE
Payer: MEDICAID

## 2022-07-26 ENCOUNTER — LAB ENCOUNTER (OUTPATIENT)
Dept: LAB | Age: 37
End: 2022-07-26
Attending: INTERNAL MEDICINE
Payer: MEDICAID

## 2022-07-26 DIAGNOSIS — R19.5 LOOSE STOOLS: ICD-10-CM

## 2022-07-26 LAB
CRYPTOSP AG STL QL IA: NEGATIVE
G LAMBLIA AG STL QL IA: NEGATIVE
NOROVIRUS GI/GII PCR: NEGATIVE

## 2022-07-26 PROCEDURE — 87427 SHIGA-LIKE TOXIN AG IA: CPT

## 2022-07-26 PROCEDURE — 87493 C DIFF AMPLIFIED PROBE: CPT

## 2022-07-26 PROCEDURE — 87798 DETECT AGENT NOS DNA AMP: CPT

## 2022-07-26 PROCEDURE — 87045 FECES CULTURE AEROBIC BACT: CPT

## 2022-07-26 PROCEDURE — 87046 STOOL CULTR AEROBIC BACT EA: CPT

## 2022-07-26 PROCEDURE — 87329 GIARDIA AG IA: CPT

## 2022-07-26 PROCEDURE — 87272 CRYPTOSPORIDIUM AG IF: CPT

## 2022-07-27 LAB — C DIFF TOX B STL QL: NEGATIVE

## 2022-08-08 ENCOUNTER — NURSE TRIAGE (OUTPATIENT)
Dept: INTERNAL MEDICINE CLINIC | Facility: CLINIC | Age: 37
End: 2022-08-08

## 2022-08-08 ENCOUNTER — TELEPHONE (OUTPATIENT)
Dept: INTERNAL MEDICINE CLINIC | Facility: CLINIC | Age: 37
End: 2022-08-08

## 2022-08-08 NOTE — TELEPHONE ENCOUNTER
Patient was left a message to call back. Transfer to triage dept [id number]    Chart reviewed. Appeared this was noted 6/17/22 also. Patient has appt 9/7/22; perhaps he can get in sooner.

## 2022-08-08 NOTE — TELEPHONE ENCOUNTER
Kolby Fredi Owen Therapist ([phone], option 2) called reporting that pts' PHQ 9 depression screening  was high scoring 25 out of 27 points. Pt was evaluated for wheelchair by therapist today.  Tati Lara wanted to pass this information to MD.     Please reply to pool: JULIO Carlson

## 2022-08-08 NOTE — TELEPHONE ENCOUNTER
[facility] physical therapist, requesting callback to discuss how a patient responded to a depression questionnaire

## 2022-08-08 NOTE — TELEPHONE ENCOUNTER
See message below. Reached pt at [address] telephone number [phone], ask pt if this is appropriate time to speak, but state\" this is not a good time. \" Call back in half an hour. Please reply to pool: EM RN TRIAGE - Triage staff to call patient today at 5pm and screen for depression or suicidal thoughts. Direct pt according to [facility] protocol.   Reported off to Kimberlyn Quick. RYANN.

## 2022-08-10 PROBLEM — F41.8 MIXED ANXIETY DEPRESSIVE DISORDER: Status: ACTIVE | Noted: 2022-08-10

## 2022-08-10 PROBLEM — F32.9 REACTIVE DEPRESSION: Status: RESOLVED | Noted: 2019-04-01 | Resolved: 2022-08-10

## 2022-08-12 ENCOUNTER — TELEPHONE (OUTPATIENT)
Dept: INTERNAL MEDICINE CLINIC | Facility: CLINIC | Age: 37
End: 2022-08-12

## 2022-08-12 NOTE — TELEPHONE ENCOUNTER
Pharm wants dr to know that the pt is on Rx Mirtazapine, which is prescribed by another dr, and pt is also taking Sertraline med, which has a drug interaction.

## 2022-08-12 NOTE — TELEPHONE ENCOUNTER
Per pharmacist Tj Ornelas, verified patient name/. Patient taking mirtazapine 30 mg, Dr Norma Lilly [place]. Sertraline 150 mg from our office. Drug interaction serotonin syndrome. Dr Danie Senior, please advise if any medication change necea  Also  Oxycodone 15 mg (#120) four times a day as need  Dr Flip Hendricks off [phone]  Has been getting for a long time along with   Hydrocodone 10/325 #240/month     Solifenacin 10 mg once daily for bladder.     Prazosin 1 mg every evening

## 2022-08-15 NOTE — TELEPHONE ENCOUNTER
Rebecca from [address] calling to follow up on message, aware patient will be contacting psychiatrist.  Sertraline will be placed on hold for now, until we speak with patient

## 2022-08-15 NOTE — TELEPHONE ENCOUNTER
Spoke to patient's brother, who states patient is not available now. He will give patient message to call the office back.

## 2022-08-15 NOTE — TELEPHONE ENCOUNTER
Please have patient review all medications with psychiatry and they should now take over these medications as I do not have access to Dr George Hammer records to know of med changes; he was on lexapro in the past and we switched to sertraline, psych may be aware of this interaction,

## 2022-08-16 NOTE — TELEPHONE ENCOUNTER
Tried calling Pt. Pt's brother answered call and is on [facility]. Per Ashley Sarmiento does prefer our office to speak with brother. Per Tiffanie Ventura, they haven't spoken to Psychiatrist yet. Pt did call yesterday. Per Tiffanie Ventura, Pt stopped taking sertraline and is on a new medication, however, couldn't name this. Informed Turner, sertraline is a new prescription that was sent by Dr. Brady Padgett. Another medication called lexapro was stopped. Per Tiffanie Ventura, he needs to verify with Pt what medications he his taking. He will call office back once he has confirmation.

## 2022-08-17 ENCOUNTER — HOSPITAL ENCOUNTER (OUTPATIENT)
Dept: GENERAL RADIOLOGY | Age: 37
Discharge: HOME | End: 2022-08-17
Attending: INTERNAL MEDICINE
Payer: MEDICAID

## 2022-08-17 DIAGNOSIS — G82.20 PARAPLEGIA AT T4 LEVEL (HCC): ICD-10-CM

## 2022-08-17 DIAGNOSIS — Z98.1 HISTORY OF SPINAL FUSION: ICD-10-CM

## 2022-08-17 DIAGNOSIS — Z87.828 HISTORY OF SPINAL CORD INJURY: ICD-10-CM

## 2022-08-17 PROCEDURE — 72052 X-RAY EXAM NECK SPINE 6/>VWS: CPT | Performed by: INTERNAL MEDICINE

## 2022-08-17 PROCEDURE — 72070 X-RAY EXAM THORAC SPINE 2VWS: CPT | Performed by: INTERNAL MEDICINE

## 2022-08-17 PROCEDURE — 72110 X-RAY EXAM L-2 SPINE 4/>VWS: CPT | Performed by: INTERNAL MEDICINE

## 2022-08-22 ENCOUNTER — TELEPHONE (OUTPATIENT)
Dept: INTERNAL MEDICINE CLINIC | Facility: CLINIC | Age: 37
End: 2022-08-22

## 2022-08-22 NOTE — TELEPHONE ENCOUNTER
Verified name and . Patient calling to request appointment with Dr. Jose Sam to discuss spine x-ray results.     Appointment scheduled:      Future Appointments   Date Time Provider Doris Lancaster   2022 11:00 AM Estefany Dominguez MD [facility]   2022  2:00 PM Estefany Dominguez MD [facility]

## 2022-08-23 ENCOUNTER — OFFICE VISIT (OUTPATIENT)
Dept: INTERNAL MEDICINE CLINIC | Facility: CLINIC | Age: 37
End: 2022-08-23
Payer: MEDICAID

## 2022-08-23 VITALS
DIASTOLIC BLOOD PRESSURE: 83 MMHG | SYSTOLIC BLOOD PRESSURE: 135 MMHG | HEIGHT: 70 IN | HEART RATE: 84 BPM | BODY MASS INDEX: 24 KG/M2

## 2022-08-23 DIAGNOSIS — M79.2 NEUROPATHIC PAIN: ICD-10-CM

## 2022-08-23 DIAGNOSIS — G82.20 PARAPLEGIA AT T4 LEVEL (HCC): Primary | ICD-10-CM

## 2022-08-23 DIAGNOSIS — Z87.828 HISTORY OF SPINAL CORD INJURY: ICD-10-CM

## 2022-08-23 DIAGNOSIS — G89.4 CHRONIC PAIN DISORDER: ICD-10-CM

## 2022-08-23 DIAGNOSIS — Z98.1 HISTORY OF SPINAL FUSION: ICD-10-CM

## 2022-08-23 PROCEDURE — 99213 OFFICE O/P EST LOW 20 MIN: CPT | Performed by: INTERNAL MEDICINE

## 2022-08-23 PROCEDURE — 3075F SYST BP GE 130 - 139MM HG: CPT | Performed by: INTERNAL MEDICINE

## 2022-08-23 PROCEDURE — 3079F DIAST BP 80-89 MM HG: CPT | Performed by: INTERNAL MEDICINE

## 2022-09-26 DIAGNOSIS — G43.009 MIGRAINE WITHOUT AURA AND WITHOUT STATUS MIGRAINOSUS, NOT INTRACTABLE: ICD-10-CM

## 2022-09-27 NOTE — TELEPHONE ENCOUNTER
Refill passed per [address] protocol. Requested Prescriptions   Pending Prescriptions Disp Refills    RIZATRIPTAN 5 MG Oral Tablet Dispersible [Pharmacy Med Name: RIZATRIPTAN ODT 5MG TABLETS] 12 tablet 3     Sig: DISSOLVE 1 TABLET EVERY 2 HOURS AS NEEDED FOR MIGRAINE. USE AT ONSET, MAY REPEAT ONCE AFTER 2 HOURS.  ONLY 2 IN 25 HOURS PERIOD        Neurology Medications Passed - 9/26/2022 12:50 PM        Passed - In person appointment or virtual visit in the past 6 mos or appointment in next 3 mos       Recent Outpatient Visits              1 month ago Paraplegia at T4 level [facility])    [address] MD    Office Visit    1 month ago Mixed anxiety depressive disorder    [address] MD    Office Visit    2 months ago Loose stools    [address] MD    Office Visit    3 months ago Acute frontal sinusitis, recurrence not specified    [address] MD    Office Visit    4 months ago Pre-operative examination    [address] MD    Office Visit                             Recent Outpatient Visits              1 month ago Paraplegia at T4 level [facility])    [address] Judy Arz[address] MD    Office Visit    1 month ago Mixed anxiety depressive disorder    [address] MD    Office Visit    2 months ago Loose stools    [address] Judy Ar[address] MD    Office Visit    3 months ago Acute frontal sinusitis, recurrence not specified    [address] Judy [address] MD    Office Visit    4 months ago Pre-operative examination    [address] MD    Office Visit

## 2022-09-28 RX ORDER — RIZATRIPTAN BENZOATE 5 MG/1
5 TABLET, ORALLY DISINTEGRATING ORAL AS NEEDED
Qty: 12 TABLET | Refills: 3 | Status: SHIPPED | OUTPATIENT
Start: 2022-09-28

## 2022-10-17 DIAGNOSIS — M79.2 NEUROPATHIC PAIN: ICD-10-CM

## 2022-10-17 DIAGNOSIS — G82.20 PARAPLEGIA AT T4 LEVEL (HCC): ICD-10-CM

## 2022-10-17 DIAGNOSIS — G89.4 CHRONIC PAIN DISORDER: ICD-10-CM

## 2022-10-17 RX ORDER — DIAZEPAM 5 MG/1
5 TABLET ORAL EVERY 12 HOURS PRN
Qty: 60 TABLET | Refills: 5 | Status: SHIPPED | OUTPATIENT
Start: 2022-10-17

## 2022-10-17 NOTE — TELEPHONE ENCOUNTER
Please review refill failed/no protocol     Requested Prescriptions     Pending Prescriptions Disp Refills    diazePAM 5 MG Oral Tab 60 tablet 2     Sig: Take 1 tablet (5 mg total) by mouth every 12 (twelve) hours as needed for Anxiety. OR FOR SPASTIC MUSCLES. Recent Visits  Date Type Provider Dept   08/23/22 Office Visit Eliza To MD [facility]   08/10/22 Office Visit Eliza To MD [facility]   07/21/22 Office Visit Eliza To MD [facility]   06/16/22 Office Visit Eliza To MD [facility]   05/16/22 Office Visit Eliza To MD [facility]   01/04/22 Office Visit Eliza To MD [facility]   10/04/21 Office Visit Eliza To MD [facility]   09/02/21 Office Visit Eliza To MD [facility]   08/05/21 Office Visit Eliza To MD [facility]   Showing recent visits within past 540 days with a meds authorizing provider and meeting all other requirements  Future Appointments  No visits were found meeting these conditions. Showing future appointments within next 150 days with a meds authorizing provider and meeting all other requirements    Requested Prescriptions   Pending Prescriptions Disp Refills    diazePAM 5 MG Oral Tab 60 tablet 2     Sig: Take 1 tablet (5 mg total) by mouth every 12 (twelve) hours as needed for Anxiety. OR FOR SPASTIC MUSCLES.         There is no refill protocol information for this order           Recent Outpatient Visits              1 month ago Paraplegia at T4 level [facility])    [address], [address] MD    Office Visit    2 months ago Mixed anxiety depressive disorder    [facility], Amy Lewis, Judy Lane MD    Office Visit    2 months ago Loose stools    [facility] [place], Judy Lane MD    Office Visit    4 months ago Acute frontal sinusitis, recurrence not specified    [address], Ashtyn Reilly, Anand Shaw MD    Office Visit    5 months ago Pre-operative examination    Gardenia Mosley MD    Office Visit

## 2022-11-15 ENCOUNTER — TELEPHONE (OUTPATIENT)
Dept: INTERNAL MEDICINE CLINIC | Facility: CLINIC | Age: 37
End: 2022-11-15

## 2022-12-07 ENCOUNTER — OFFICE VISIT (OUTPATIENT)
Dept: INTERNAL MEDICINE CLINIC | Facility: CLINIC | Age: 37
End: 2022-12-07
Payer: MEDICAID

## 2022-12-07 VITALS — DIASTOLIC BLOOD PRESSURE: 72 MMHG | SYSTOLIC BLOOD PRESSURE: 119 MMHG | HEART RATE: 70 BPM

## 2022-12-07 DIAGNOSIS — N52.1 ERECTILE DISORDER DUE TO MEDICAL CONDITION IN MALE: ICD-10-CM

## 2022-12-07 DIAGNOSIS — M79.2 NEUROPATHIC PAIN: ICD-10-CM

## 2022-12-07 DIAGNOSIS — F41.8 MIXED ANXIETY DEPRESSIVE DISORDER: ICD-10-CM

## 2022-12-07 DIAGNOSIS — G89.4 CHRONIC PAIN DISORDER: ICD-10-CM

## 2022-12-07 DIAGNOSIS — G82.20 PARAPLEGIA AT T4 LEVEL (HCC): Primary | ICD-10-CM

## 2022-12-07 PROCEDURE — 3074F SYST BP LT 130 MM HG: CPT | Performed by: INTERNAL MEDICINE

## 2022-12-07 PROCEDURE — 3078F DIAST BP <80 MM HG: CPT | Performed by: INTERNAL MEDICINE

## 2022-12-07 PROCEDURE — 99214 OFFICE O/P EST MOD 30 MIN: CPT | Performed by: INTERNAL MEDICINE

## 2022-12-07 RX ORDER — SERTRALINE HYDROCHLORIDE 100 MG/1
100 TABLET, FILM COATED ORAL DAILY
Qty: 90 TABLET | Refills: 3 | Status: SHIPPED | OUTPATIENT
Start: 2022-12-07

## 2022-12-07 RX ORDER — DIAZEPAM 5 MG/1
5 TABLET ORAL EVERY 8 HOURS PRN
Qty: 90 TABLET | Refills: 5 | Status: SHIPPED | OUTPATIENT
Start: 2022-12-07

## 2022-12-07 NOTE — PATIENT INSTRUCTIONS
I would like for you to switch from vitamin D 50,000 weekly to vitamin D3 over-the-counter 5000 units daily. By taking it daily you are likely to absorb more per pill. Start taking a super B complex if you are not already along with 1000 mcg of B12. These 2 will give you extra energy and there is no side effects.

## 2023-01-03 ENCOUNTER — TELEPHONE (OUTPATIENT)
Dept: INTERNAL MEDICINE CLINIC | Facility: CLINIC | Age: 38
End: 2023-01-03

## 2023-01-03 DIAGNOSIS — R39.9 SYMPTOMS INVOLVING URINARY SYSTEM: Primary | ICD-10-CM

## 2023-01-03 NOTE — TELEPHONE ENCOUNTER
Last office visit 2022    Call from patient's brother Hernan Schneider, on [facility], verified patient name/. Wants order to check urine for UTI. Urine is darker in color and is showing some \"objects\" in the urine. Question if this is sediment. Patient is paralyzed from waist down and does self cathing. Denies fever, pain, hematuria. Dr Barak Bellamy, please advise, order pended. Brother states he has specimen cups.

## 2023-01-06 NOTE — TELEPHONE ENCOUNTER
Pt has not read "[employer] message yet. Spoke with pt name and  verified. He is aware orders placed.  Will retreive cup and give sample

## 2023-01-16 ENCOUNTER — APPOINTMENT (OUTPATIENT)
Dept: LAB | Age: 38
End: 2023-01-16
Attending: INTERNAL MEDICINE
Payer: MEDICAID

## 2023-01-16 PROCEDURE — 87086 URINE CULTURE/COLONY COUNT: CPT | Performed by: INTERNAL MEDICINE

## 2023-01-16 PROCEDURE — 81001 URINALYSIS AUTO W/SCOPE: CPT | Performed by: INTERNAL MEDICINE

## 2023-01-16 PROCEDURE — 81015 MICROSCOPIC EXAM OF URINE: CPT | Performed by: INTERNAL MEDICINE

## 2023-01-16 PROCEDURE — 87186 SC STD MICRODIL/AGAR DIL: CPT | Performed by: INTERNAL MEDICINE

## 2023-01-16 PROCEDURE — 87088 URINE BACTERIA CULTURE: CPT | Performed by: INTERNAL MEDICINE

## 2023-01-18 LAB
BILIRUB UR QL: NEGATIVE
COLOR UR: YELLOW
GLUCOSE UR-MCNC: NEGATIVE MG/DL
HGB UR QL STRIP.AUTO: NEGATIVE
KETONES UR-MCNC: NEGATIVE MG/DL
NITRITE UR QL STRIP.AUTO: NEGATIVE
PH UR: 6 [PH] (ref 5–8)
SP GR UR STRIP: 1.02 (ref 1–1.03)
UROBILINOGEN UR STRIP-ACNC: 0.2
WBC #/AREA URNS AUTO: >50 /HPF
WBC #/AREA URNS AUTO: >50 /HPF

## 2023-01-22 PROBLEM — N39.0 RECURRENT UTI: Status: ACTIVE | Noted: 2023-01-22

## 2023-07-12 ENCOUNTER — NURSE TRIAGE (OUTPATIENT)
Dept: INTERNAL MEDICINE CLINIC | Facility: CLINIC | Age: 38
End: 2023-07-12

## 2023-07-12 DIAGNOSIS — A09 DIARRHEA OF INFECTIOUS ORIGIN: Primary | ICD-10-CM

## 2023-07-12 NOTE — TELEPHONE ENCOUNTER
Please reply to pool: EM RN TRIAGE  Action Requested: Summary for Provider     []  Critical Lab, Recommendations Needed  [x] Need Additional Advice  []   FYI    []   Need Orders  [] Need Medications Sent to Pharmacy  []  Other     SUMMARY: Patient contacts clinic reporting vomiting and diarrhea x 1 week. Paraplegic on bowel regimen. He is hesitant to give me details about the number of stools he is having but states that there is stool on his skin. Vomiting has improved, last episode once yesterday. Denies fever or chills. Does feel some upper abdominal pain. Mild dizziness. Patient refuses to see anyone but Dr. Tami Bower. Refuses IC or appt with another provider. I advised the patient cannot wait 2 weeks to see Dr. Tami Bower. The patient then terminated the call.   Dr. Tami Bower please advise if you are able to work the patient in.      Reason for call: Acute  Onset: Data Unavailable                       Reason for Disposition   MODERATE diarrhea (e.g., 4-6 times / day more than normal) and present > 48 hours (2 days)    Protocols used: Diarrhea-A-OH

## 2023-07-14 ENCOUNTER — APPOINTMENT (OUTPATIENT)
Dept: LAB | Age: 38
End: 2023-07-14
Attending: INTERNAL MEDICINE
Payer: MEDICAID

## 2023-07-14 NOTE — TELEPHONE ENCOUNTER
Noted, patient has difficulty with ambulation, will order stool cultures/cdiff, recommend he have someone  the sample cup for him and they can bring it back to the lab if he is unable to. Increase hydration. Eat bland diet. Plenty of gatorade and coconut water. BRAT. If anything is positive will add in via video visit to discuss treatment options. If blood, not tolerating PO, having worsening abdominal pains needs  imaging in ER.

## 2023-07-15 LAB
CRYPTOSP AG STL QL IA: NEGATIVE
G LAMBLIA AG STL QL IA: NEGATIVE
NOROVIRUS GI/GII PCR: NEGATIVE

## 2023-07-16 LAB — C DIFF TOX B STL QL: NEGATIVE

## 2023-08-02 ENCOUNTER — OFFICE VISIT (OUTPATIENT)
Dept: INTERNAL MEDICINE CLINIC | Facility: CLINIC | Age: 38
End: 2023-08-02

## 2023-08-02 ENCOUNTER — LAB ENCOUNTER (OUTPATIENT)
Dept: LAB | Age: 38
End: 2023-08-02
Attending: INTERNAL MEDICINE
Payer: MEDICAID

## 2023-08-02 VITALS — DIASTOLIC BLOOD PRESSURE: 66 MMHG | SYSTOLIC BLOOD PRESSURE: 104 MMHG | HEART RATE: 65 BPM

## 2023-08-02 DIAGNOSIS — Z00.00 ANNUAL PHYSICAL EXAM: Primary | ICD-10-CM

## 2023-08-02 DIAGNOSIS — Z23 NEED FOR TETANUS, DIPHTHERIA, AND ACELLULAR PERTUSSIS (TDAP) VACCINE: ICD-10-CM

## 2023-08-02 DIAGNOSIS — E55.9 VITAMIN D DEFICIENCY: ICD-10-CM

## 2023-08-02 DIAGNOSIS — N52.1 ERECTILE DISORDER DUE TO MEDICAL CONDITION IN MALE: ICD-10-CM

## 2023-08-02 DIAGNOSIS — F41.8 MIXED ANXIETY DEPRESSIVE DISORDER: ICD-10-CM

## 2023-08-02 DIAGNOSIS — Z23 IMMUNIZATION DUE: ICD-10-CM

## 2023-08-02 LAB
ALBUMIN SERPL-MCNC: 3.8 G/DL (ref 3.4–5)
ALBUMIN/GLOB SERPL: 1.2 {RATIO} (ref 1–2)
ALP LIVER SERPL-CCNC: 105 U/L
ALT SERPL-CCNC: 66 U/L
ANION GAP SERPL CALC-SCNC: 3 MMOL/L (ref 0–18)
AST SERPL-CCNC: 19 U/L (ref 15–37)
BILIRUB SERPL-MCNC: 0.3 MG/DL (ref 0.1–2)
BUN BLD-MCNC: 14 MG/DL (ref 7–18)
CALCIUM BLD-MCNC: 8.6 MG/DL (ref 8.5–10.1)
CHLORIDE SERPL-SCNC: 110 MMOL/L (ref 98–112)
CHOLEST SERPL-MCNC: 227 MG/DL (ref ?–200)
CO2 SERPL-SCNC: 25 MMOL/L (ref 21–32)
CREAT BLD-MCNC: 0.42 MG/DL
EGFRCR SERPLBLD CKD-EPI 2021: 143 ML/MIN/1.73M2 (ref 60–?)
ERYTHROCYTE [DISTWIDTH] IN BLOOD BY AUTOMATED COUNT: 14.2 %
EST. AVERAGE GLUCOSE BLD GHB EST-MCNC: 123 MG/DL (ref 68–126)
FASTING PATIENT LIPID ANSWER: NO
FASTING STATUS PATIENT QL REPORTED: NO
GLOBULIN PLAS-MCNC: 3.1 G/DL (ref 2.8–4.4)
GLUCOSE BLD-MCNC: 108 MG/DL (ref 70–99)
HBA1C MFR BLD: 5.9 % (ref ?–5.7)
HCT VFR BLD AUTO: 38.5 %
HDLC SERPL-MCNC: 23 MG/DL (ref 40–59)
HGB BLD-MCNC: 12.6 G/DL
LDLC SERPL CALC-MCNC: 107 MG/DL (ref ?–100)
MCH RBC QN AUTO: 29.6 PG (ref 26–34)
MCHC RBC AUTO-ENTMCNC: 32.7 G/DL (ref 31–37)
MCV RBC AUTO: 90.6 FL
NONHDLC SERPL-MCNC: 204 MG/DL (ref ?–130)
OSMOLALITY SERPL CALC.SUM OF ELEC: 287 MOSM/KG (ref 275–295)
PLATELET # BLD AUTO: 232 10(3)UL (ref 150–450)
POTASSIUM SERPL-SCNC: 4 MMOL/L (ref 3.5–5.1)
PROT SERPL-MCNC: 6.9 G/DL (ref 6.4–8.2)
RBC # BLD AUTO: 4.25 X10(6)UL
SODIUM SERPL-SCNC: 138 MMOL/L (ref 136–145)
TRIGL SERPL-MCNC: 559 MG/DL (ref 30–149)
TSI SER-ACNC: 1.74 MIU/ML (ref 0.36–3.74)
VIT D+METAB SERPL-MCNC: 5.4 NG/ML (ref 30–100)
VLDLC SERPL CALC-MCNC: 99 MG/DL (ref 0–30)
WBC # BLD AUTO: 7.2 X10(3) UL (ref 4–11)

## 2023-08-02 PROCEDURE — 3078F DIAST BP <80 MM HG: CPT | Performed by: INTERNAL MEDICINE

## 2023-08-02 PROCEDURE — 80053 COMPREHEN METABOLIC PANEL: CPT | Performed by: INTERNAL MEDICINE

## 2023-08-02 PROCEDURE — 82306 VITAMIN D 25 HYDROXY: CPT | Performed by: INTERNAL MEDICINE

## 2023-08-02 PROCEDURE — 99395 PREV VISIT EST AGE 18-39: CPT | Performed by: INTERNAL MEDICINE

## 2023-08-02 PROCEDURE — 84443 ASSAY THYROID STIM HORMONE: CPT | Performed by: INTERNAL MEDICINE

## 2023-08-02 PROCEDURE — 85027 COMPLETE CBC AUTOMATED: CPT | Performed by: INTERNAL MEDICINE

## 2023-08-02 PROCEDURE — 80061 LIPID PANEL: CPT | Performed by: INTERNAL MEDICINE

## 2023-08-02 PROCEDURE — 83036 HEMOGLOBIN GLYCOSYLATED A1C: CPT | Performed by: INTERNAL MEDICINE

## 2023-08-02 PROCEDURE — 36415 COLL VENOUS BLD VENIPUNCTURE: CPT | Performed by: INTERNAL MEDICINE

## 2023-08-02 PROCEDURE — 3074F SYST BP LT 130 MM HG: CPT | Performed by: INTERNAL MEDICINE

## 2023-08-02 RX ORDER — SERTRALINE HYDROCHLORIDE 100 MG/1
100 TABLET, FILM COATED ORAL DAILY
Qty: 90 TABLET | Refills: 9 | Status: SHIPPED | OUTPATIENT
Start: 2023-08-02

## 2023-08-02 NOTE — PATIENT INSTRUCTIONS
Your stool sample did not show any evidence of infection therefore your diarrhea/loose stools may be due to your medication primarily the sertraline. Lets cut this down from 150 mg daily to 100 mg daily and if you notice that your stooling improves and that is the answer. With that being said since we are reducing this dose if your anxiety returns we may need to use an adjunct such as buspirone or we can consider 100 mg daily and 50 mg nightly to hopefully give you the benefit without the diarrhea, typically the higher dose taken at a single time produces the side effects.     Send me a [employer] message if this does not have any improvement
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
## 2023-08-04 ENCOUNTER — TELEPHONE (OUTPATIENT)
Dept: INTERNAL MEDICINE CLINIC | Facility: CLINIC | Age: 38
End: 2023-08-04

## 2023-08-04 NOTE — TELEPHONE ENCOUNTER
Patient is requesting a call from Dr Arnulfo Shaikh to discuss labs that came back. Patient is very worried and is thinking he should be taking some new medications. Test says poss liver problems- patient wishes a call today as he is losing his bowel often    Please call [phone]- mothers number

## 2023-08-04 NOTE — TELEPHONE ENCOUNTER
Patient contacted. I reassured him of Dr. Cruz Round result notes. He would like to schedule follow up to discuss and review abdominal symptoms. Follow up booked 8/17.

## 2023-08-06 DIAGNOSIS — J01.10 ACUTE FRONTAL SINUSITIS, RECURRENCE NOT SPECIFIED: ICD-10-CM

## 2023-08-07 RX ORDER — FEXOFENADINE HCL 180 MG/1
180 TABLET ORAL NIGHTLY
Qty: 90 TABLET | Refills: 3 | Status: SHIPPED | OUTPATIENT
Start: 2023-08-07

## 2023-08-07 NOTE — TELEPHONE ENCOUNTER
Refill passed per [place] [employer] [place], [facility] protocol.      Requested Prescriptions   Pending Prescriptions Disp Refills    ALLERGY RELIEF 180 MG Oral Tab [Pharmacy Med Name: FEXOFENADINE 180MG TABLETS (OTC)] 90 tablet 3     Sig: TAKE 1 TABLET BY MOUTH EVERY NIGHT FOR ALLERGIES       Allergy Medication Protocol Passed - 8/6/2023  3:46 AM        Passed - In person appointment or virtual visit in the past 12 mos or appointment in next 3 mos     Recent Outpatient Visits              5 days ago Annual physical exam    [address], Unique Bonilla MD    Office Visit    8 months ago Paraplegia at T4 level [facility])    [address], Unique Bonilla MD    Office Visit    11 months ago Paraplegia at T4 level [facility])    [address], Unique Bonilla MD    Office Visit    12 months ago Mixed anxiety depressive disorder    [address], Unique Bonilla MD    Office Visit    1 year ago Loose stools    [address], Unique Bonilla MD    Office Visit          Future Appointments         Provider Department Appt Notes    In 1 week Zenobia Nava MD [address], [place]     In 1 week [facility] [address] [facility] [place] Follow up labs and abdominal pain                     Recent Outpatient Visits              5 days ago Annual physical exam    [address], Unique Bonilla MD    Office Visit    8 months ago Paraplegia at T4 level [facility])    [address], Unique Bonilla MD    Office Visit    11 months ago Paraplegia at T4 level [facility])    [address], Unique Bonilla MD    Office Visit    12 months ago Mixed anxiety depressive disorder    [facility], [address], Yanira Sandra MD    Office Visit    1 year ago Loose stools    Javid Singh MD    Office Visit             Future Appointments         Provider Department Appt Notes    In 1 week Emaline Krabbe, MD [address]     In 1 week Emaline Krabbe, MD [address] Follow up labs and abdominal pain

## 2023-08-17 ENCOUNTER — LAB ENCOUNTER (OUTPATIENT)
Dept: LAB | Age: 38
End: 2023-08-17
Attending: INTERNAL MEDICINE
Payer: MEDICAID

## 2023-08-17 ENCOUNTER — OFFICE VISIT (OUTPATIENT)
Dept: INTERNAL MEDICINE CLINIC | Facility: CLINIC | Age: 38
End: 2023-08-17

## 2023-08-17 VITALS
HEART RATE: 130 BPM | SYSTOLIC BLOOD PRESSURE: 100 MMHG | DIASTOLIC BLOOD PRESSURE: 70 MMHG | WEIGHT: 170 LBS | BODY MASS INDEX: 24 KG/M2

## 2023-08-17 DIAGNOSIS — G82.20 PARAPLEGIA AT T4 LEVEL (HCC): Primary | ICD-10-CM

## 2023-08-17 DIAGNOSIS — R10.13 EPIGASTRIC ABDOMINAL PAIN: ICD-10-CM

## 2023-08-17 DIAGNOSIS — R74.8 ELEVATED LIVER ENZYMES: ICD-10-CM

## 2023-08-17 DIAGNOSIS — R25.2 SPASTICITY: ICD-10-CM

## 2023-08-17 DIAGNOSIS — E78.1 HYPERTRIGLYCERIDEMIA: ICD-10-CM

## 2023-08-17 LAB
ALBUMIN SERPL-MCNC: 3.5 G/DL (ref 3.4–5)
ALP LIVER SERPL-CCNC: 117 U/L
ALT SERPL-CCNC: 437 U/L
AST SERPL-CCNC: 151 U/L (ref 15–37)
BILIRUB DIRECT SERPL-MCNC: 0.2 MG/DL (ref 0–0.2)
BILIRUB SERPL-MCNC: 0.4 MG/DL (ref 0.1–2)
CHOLEST SERPL-MCNC: 214 MG/DL (ref ?–200)
FASTING PATIENT LIPID ANSWER: YES
HDLC SERPL-MCNC: 23 MG/DL (ref 40–59)
LDLC SERPL CALC-MCNC: 108 MG/DL (ref ?–100)
LIPASE SERPL-CCNC: 16 U/L (ref 13–75)
NONHDLC SERPL-MCNC: 191 MG/DL (ref ?–130)
PROT SERPL-MCNC: 7.2 G/DL (ref 6.4–8.2)
TRIGL SERPL-MCNC: 482 MG/DL (ref 30–149)
VLDLC SERPL CALC-MCNC: 85 MG/DL (ref 0–30)

## 2023-08-17 PROCEDURE — 99214 OFFICE O/P EST MOD 30 MIN: CPT | Performed by: INTERNAL MEDICINE

## 2023-08-17 PROCEDURE — 3078F DIAST BP <80 MM HG: CPT | Performed by: INTERNAL MEDICINE

## 2023-08-17 PROCEDURE — 36415 COLL VENOUS BLD VENIPUNCTURE: CPT

## 2023-08-17 PROCEDURE — 83690 ASSAY OF LIPASE: CPT

## 2023-08-17 PROCEDURE — 3074F SYST BP LT 130 MM HG: CPT | Performed by: INTERNAL MEDICINE

## 2023-08-17 PROCEDURE — 80061 LIPID PANEL: CPT

## 2023-08-17 PROCEDURE — 80076 HEPATIC FUNCTION PANEL: CPT

## 2023-08-17 RX ORDER — ESCITALOPRAM OXALATE 10 MG/1
10 TABLET ORAL DAILY
COMMUNITY

## 2023-08-17 RX ORDER — PRAZOSIN HYDROCHLORIDE 1 MG/1
1 CAPSULE ORAL NIGHTLY
COMMUNITY
Start: 2023-06-02

## 2023-08-17 RX ORDER — MIRTAZAPINE 30 MG/1
30 TABLET, FILM COATED ORAL NIGHTLY
COMMUNITY
Start: 2023-06-02

## 2023-08-17 RX ORDER — DIAZEPAM 10 MG/1
10 TABLET ORAL EVERY 8 HOURS PRN
Qty: 90 TABLET | Refills: 0 | Status: SHIPPED | OUTPATIENT
Start: 2023-08-17

## 2023-08-17 RX ORDER — SOLIFENACIN SUCCINATE 10 MG/1
10 TABLET, FILM COATED ORAL DAILY
COMMUNITY

## 2023-08-21 ENCOUNTER — HOSPITAL ENCOUNTER (OUTPATIENT)
Dept: ULTRASOUND IMAGING | Facility: HOSPITAL | Age: 38
Discharge: HOME | End: 2023-08-21
Attending: INTERNAL MEDICINE
Payer: MEDICAID

## 2023-08-21 DIAGNOSIS — R74.8 ELEVATED LIVER ENZYMES: Primary | ICD-10-CM

## 2023-08-21 DIAGNOSIS — R74.8 ELEVATED LIVER ENZYMES: ICD-10-CM

## 2023-08-21 PROCEDURE — 76705 ECHO EXAM OF ABDOMEN: CPT | Performed by: INTERNAL MEDICINE

## 2023-08-21 PROCEDURE — 76981 USE PARENCHYMA: CPT | Performed by: INTERNAL MEDICINE

## 2023-08-30 ENCOUNTER — TELEPHONE (OUTPATIENT)
Dept: INTERNAL MEDICINE CLINIC | Facility: CLINIC | Age: 38
End: 2023-08-30

## 2023-09-18 DIAGNOSIS — R25.2 SPASTICITY: ICD-10-CM

## 2023-09-18 DIAGNOSIS — G82.20 PARAPLEGIA AT T4 LEVEL (HCC): ICD-10-CM

## 2023-09-18 NOTE — TELEPHONE ENCOUNTER
Pt is calling for status of his medication refill request. Per the patient he is out of medication. Pt can be reached at [phone].

## 2023-09-18 NOTE — TELEPHONE ENCOUNTER
Please review. Protocol failed/ No protocol.     Requested Prescriptions   Pending Prescriptions Disp Refills    DIAZEPAM 10 MG Oral Tab [Pharmacy Med Name: DIAZEPAM 10MG TABLETS] 90 tablet 0     Sig: TAKE 1 TABLET(10 MG) BY MOUTH EVERY 8 HOURS AS NEEDED FOR MUSCLE SPASMS       There is no refill protocol information for this order          Recent Outpatient Visits              1 month ago Paraplegia at T4 level [facility])    [address], Rocky Quinones MD    Office Visit    1 month ago Annual physical exam    [address] MD    Office Visit    9 months ago Paraplegia at T4 level [facility])    [address], Rocky Quinones MD    Office Visit    1 year ago Paraplegia at T4 level [facility])    [address], Rocky Quinones MD    Office Visit    1 year ago Mixed anxiety depressive disorder    [address], [address], Rocky Quinones MD    Office Visit

## 2023-09-19 RX ORDER — DIAZEPAM 10 MG/1
10 TABLET ORAL EVERY 8 HOURS PRN
Qty: 90 TABLET | Refills: 0 | Status: SHIPPED | OUTPATIENT
Start: 2023-11-19

## 2023-09-19 RX ORDER — DIAZEPAM 10 MG/1
10 TABLET ORAL EVERY 8 HOURS PRN
Qty: 90 TABLET | Refills: 0 | Status: SHIPPED | OUTPATIENT
Start: 2023-10-19

## 2023-09-19 RX ORDER — DIAZEPAM 10 MG/1
10 TABLET ORAL EVERY 8 HOURS PRN
Qty: 90 TABLET | Refills: 0 | OUTPATIENT
Start: 2023-09-19

## 2023-09-19 RX ORDER — DIAZEPAM 10 MG/1
10 TABLET ORAL EVERY 8 HOURS PRN
Qty: 90 TABLET | Refills: 0 | Status: SHIPPED | OUTPATIENT
Start: 2023-09-19

## 2023-09-19 NOTE — TELEPHONE ENCOUNTER
Pt following up on call. Relayed Dr. Rosi Campos message. Pt instructed to call pharmacy to  Rx for today. Pt verbalized understanding. Pharmacy verified.

## 2023-09-19 NOTE — TELEPHONE ENCOUNTER
Please let patient know that I have given him a triplicate of the diazepam.  He will have 1 dispensed today, 1 on 10/19/2023 and another on 11/19/2023. Pharmacy should have these and he should not need to request any diazepam until November.

## 2023-09-21 ENCOUNTER — TELEPHONE (OUTPATIENT)
Dept: INTERNAL MEDICINE CLINIC | Facility: CLINIC | Age: 38
End: 2023-09-21

## 2023-09-21 DIAGNOSIS — R25.2 SPASTICITY: ICD-10-CM

## 2023-09-21 DIAGNOSIS — G82.20 PARAPLEGIA AT T4 LEVEL (HCC): ICD-10-CM

## 2023-09-21 NOTE — TELEPHONE ENCOUNTER
diazePAM 10 MG Oral Tab, Take 1 tablet (10 mg total) by mouth every 8 (eight) hours as needed (spastic paralysis and anxiety). , Disp: 90 tablet, Rfl: 0    KEY: [id number]

## 2023-10-06 ENCOUNTER — NURSE TRIAGE (OUTPATIENT)
Dept: INTERNAL MEDICINE CLINIC | Facility: CLINIC | Age: 38
End: 2023-10-06

## 2023-10-06 NOTE — TELEPHONE ENCOUNTER
Brother Audrey Benson called to schedule appt. Patient hx of chronic back pain; has severe back pain. He states sometimes a 4 and then severe at a 10 pain scale. There are no available appts today. Brother advised he go to ER since pain is severe. Patient's brother verbalized understanding. F/u appt made 10/10/23.     Reason for Disposition   SEVERE back pain (e.g., excruciating, unable to do any normal activities) and not improved after pain medicine and CARE ADVICE    Protocols used: Back Pain-A-OH

## 2023-10-10 ENCOUNTER — OFFICE VISIT (OUTPATIENT)
Dept: INTERNAL MEDICINE CLINIC | Facility: CLINIC | Age: 38
End: 2023-10-10

## 2023-10-10 ENCOUNTER — LAB ENCOUNTER (OUTPATIENT)
Dept: LAB | Age: 38
End: 2023-10-10
Attending: INTERNAL MEDICINE
Payer: MEDICAID

## 2023-10-10 VITALS
DIASTOLIC BLOOD PRESSURE: 81 MMHG | SYSTOLIC BLOOD PRESSURE: 125 MMHG | WEIGHT: 165 LBS | BODY MASS INDEX: 24 KG/M2 | HEART RATE: 84 BPM

## 2023-10-10 DIAGNOSIS — M79.2 NEUROPATHIC PAIN: ICD-10-CM

## 2023-10-10 DIAGNOSIS — E55.9 VITAMIN D DEFICIENCY: ICD-10-CM

## 2023-10-10 DIAGNOSIS — M85.88 OSTEOPENIA OF LUMBAR SPINE: Primary | ICD-10-CM

## 2023-10-10 DIAGNOSIS — G82.20 PARAPLEGIA AT T4 LEVEL (HCC): ICD-10-CM

## 2023-10-10 DIAGNOSIS — G89.4 CHRONIC PAIN DISORDER: ICD-10-CM

## 2023-10-10 DIAGNOSIS — Z12.5 ENCOUNTER FOR SCREENING FOR MALIGNANT NEOPLASM OF PROSTATE: ICD-10-CM

## 2023-10-10 LAB
ALBUMIN SERPL-MCNC: 4.4 G/DL (ref 3.4–5)
ALBUMIN/GLOB SERPL: 1.4 {RATIO} (ref 1–2)
ALP LIVER SERPL-CCNC: 105 U/L
ALT SERPL-CCNC: 110 U/L
ANION GAP SERPL CALC-SCNC: 8 MMOL/L (ref 0–18)
AST SERPL-CCNC: 27 U/L (ref 15–37)
BILIRUB SERPL-MCNC: 0.4 MG/DL (ref 0.1–2)
BUN BLD-MCNC: 11 MG/DL (ref 7–18)
BUN/CREAT SERPL: 24.4 (ref 10–20)
CALCIUM BLD-MCNC: 9.4 MG/DL (ref 8.5–10.1)
CHLORIDE SERPL-SCNC: 108 MMOL/L (ref 98–112)
CO2 SERPL-SCNC: 27 MMOL/L (ref 21–32)
COMPLEXED PSA SERPL-MCNC: 0.6 NG/ML (ref ?–4)
CREAT BLD-MCNC: 0.45 MG/DL
EGFRCR SERPLBLD CKD-EPI 2021: 140 ML/MIN/1.73M2 (ref 60–?)
FASTING STATUS PATIENT QL REPORTED: YES
GLOBULIN PLAS-MCNC: 3.1 G/DL (ref 2.8–4.4)
GLUCOSE BLD-MCNC: 99 MG/DL (ref 70–99)
OSMOLALITY SERPL CALC.SUM OF ELEC: 295 MOSM/KG (ref 275–295)
POTASSIUM SERPL-SCNC: 4.6 MMOL/L (ref 3.5–5.1)
PROT SERPL-MCNC: 7.5 G/DL (ref 6.4–8.2)
SODIUM SERPL-SCNC: 143 MMOL/L (ref 136–145)

## 2023-10-10 PROCEDURE — 3074F SYST BP LT 130 MM HG: CPT | Performed by: INTERNAL MEDICINE

## 2023-10-10 PROCEDURE — 80053 COMPREHEN METABOLIC PANEL: CPT

## 2023-10-10 PROCEDURE — 99214 OFFICE O/P EST MOD 30 MIN: CPT | Performed by: INTERNAL MEDICINE

## 2023-10-10 PROCEDURE — 36415 COLL VENOUS BLD VENIPUNCTURE: CPT

## 2023-10-10 PROCEDURE — 3079F DIAST BP 80-89 MM HG: CPT | Performed by: INTERNAL MEDICINE

## 2023-10-10 RX ORDER — CHOLECALCIFEROL (VITAMIN D3) 125 MCG
CAPSULE ORAL
Qty: 150 CAPSULE | Refills: 0 | Status: SHIPPED | OUTPATIENT
Start: 2023-10-10 | End: 2024-02-07

## 2023-10-10 NOTE — PATIENT INSTRUCTIONS
1.  You will go downstairs today and get the prostate level done along with your kidney liver electrolytes. 2.  I would like for you to start over-the-counter vitamin D3 5000 units twice daily for 3 months then 5000 units daily thereafter. Take this with 2 extra strength Tums to help absorb the calcium. You may use calcium supplements but Tums seems to absorb more readily. 3.  I will order the DEXA bone scan so please get this done. 4.  I will order an MRI of your cervical thoracic and lumbar spine, this is without contrast, please get this done at your next/earliest convenience. 5.  Please follow-up with our neurosurgeon to get a second opinion, ideally having the MRI done before that visit would be beneficial but it is not required therefore if you get an appointment to see them before the MRI is done go ahead and see them and if you can please try to grab all of the prior imaging disks or results and bring them with you to the office for him to review. Please show up 20 minutes early so they can have time to go through everything.

## 2023-10-28 ENCOUNTER — HOSPITAL ENCOUNTER (OUTPATIENT)
Dept: BONE DENSITY | Age: 38
Discharge: HOME | End: 2023-10-28
Attending: INTERNAL MEDICINE

## 2023-10-28 DIAGNOSIS — M85.88 OSTEOPENIA OF LUMBAR SPINE: ICD-10-CM

## 2023-10-28 PROCEDURE — 77080 DXA BONE DENSITY AXIAL: CPT | Performed by: INTERNAL MEDICINE

## 2023-10-29 PROBLEM — M85.80 OSTEOPENIA DETERMINED BY X-RAY: Status: ACTIVE | Noted: 2023-10-29

## 2023-10-30 ENCOUNTER — TELEPHONE (OUTPATIENT)
Dept: INTERNAL MEDICINE CLINIC | Facility: CLINIC | Age: 38
End: 2023-10-30

## 2023-10-30 NOTE — TELEPHONE ENCOUNTER
Spoke to Kasie, brother,  (on [employer], verified patient's name and ). He said the card was last certified a few years ago and that person is longer on practice. He said patient uses this for pain. Relayed that Dr. Db Clark does not do medical marijuana certifications. Kasie is asking if Dr. Db Clark can provide any information on who else they can see regarding this. Dr. Db Clark, please advise. Triage, ok to send [employer] message with details of response.

## 2023-10-30 NOTE — TELEPHONE ENCOUNTER
Patient is calling and states that he previously had a medical card for marijuana. He states it  and he wants to know what to do to see if he wants to see if he needs to continue to use it or if he is okay to be off of it.

## 2023-10-31 NOTE — TELEPHONE ENCOUNTER
Noted. Letter of necessity sent to patient, if he needs it signed please print and will sign upon return tomorrow.

## 2023-11-14 ENCOUNTER — TELEPHONE (OUTPATIENT)
Dept: ADMINISTRATIVE | Age: 38
End: 2023-11-14

## 2023-11-14 NOTE — TELEPHONE ENCOUNTER
This patient has t4 paraplegia, this is well known. How can this be denied? This is medically necessary. He has numerous surgeries on his spine as well which are failing. Patient has met all criteria. His nerves do not work because he is paralyzed. .. please appeal, will do prior auth if needed.

## 2023-11-14 NOTE — TELEPHONE ENCOUNTER
Hello,    Prior authorization for MRI CERVICAL+THORACIC+LUMBAR SPINE (CPT=[id number]) has been denied by payer/[employer]. Denial rationale MRI C SPINE: \"Your records do not show documentation of physical exam results that included a detailed nervous system (a network of nerve cells and fibers that send nerve messages between parts of your body) exam performed after your symptoms started or changed that support imaging. Imaging requires six weeks of provider directed treatment to be completed. This must have been completed in the past three months without improved symptoms. Contact(via office visit, phone, email, or messaging) must occur after the treatment is completed. This has not been met because: You have not completed six weeks of provider directed treatment. \"      Denial rationale MRI T SPINE: \"Your records do not show documentation of physical exam results that included a detailed nervous system (a network of nerve cells and fibers that send nerve messages between parts of your body) exam performed after your symptoms started or changed that support imaging. Imaging requires six weeks of provider directed treatment to be completed. This must have been completed in the past three months without improved symptoms. Contact (via office visit, phone, email, or messaging) must occur after the treatment is completed. This has not been met because: You have not completed six weeks of provider directed treatment. \"      Denial rationale MRI L SPINE: \"Imaging requires six weeks of provider directed treatment to be completed. This must have been completed in the past three months without improved symptoms. Contact (via office visit, phone, email, or messaging) must occur after the treatment is completed. This has not been met because: You have not completed six weeks of provider directed treatment. The provider directed treatment did not occur within the last three months.     Symptoms must be the same or worse after treatment to support imaging. \"         Case remains [id number] eligible until end of day 11/20/2023. Case remains first level appeal eligible for the next 60 calendar days, appeal information may be found within denial letter. [id number] offer letter and denial letter PDF's may be found within the Media tab. Please un-check Clinical Info Only to view. Patient is currently scheduled 11/19/2023 for MRI C+T+L SPINE. Please advise,  Thank you!

## 2023-11-15 NOTE — TELEPHONE ENCOUNTER
Please set up peer to peer with insurance MD, or patient can see his ortho specialist to see if they can get this approved. 16

## 2023-11-16 NOTE — TELEPHONE ENCOUNTER
Spoke with [employer] and Scheduled Peer to Peer for Monday 11/20/23 at 12:15pm with Lazaro Bloom will be calling.

## 2023-11-20 NOTE — TELEPHONE ENCOUNTER
Lizzy Hernandez is calling for scheduled peer to peer, while attempting to reach the office Dr. Isaak Hope disconnected.

## 2023-11-21 ENCOUNTER — TELEPHONE (OUTPATIENT)
Dept: INTERNAL MEDICINE CLINIC | Facility: CLINIC | Age: 38
End: 2023-11-21

## 2023-11-21 NOTE — TELEPHONE ENCOUNTER
Dr. Tayo King you follow up with spine surgeon to get this imaging; your insurance will refuse this as they already refused it for an indicated reason. They likely just want a specialist to order it, unfortunately.

## 2023-11-28 ENCOUNTER — LAB ENCOUNTER (OUTPATIENT)
Dept: LAB | Age: 38
End: 2023-11-28
Attending: INTERNAL MEDICINE
Payer: MEDICAID

## 2023-11-28 ENCOUNTER — NURSE TRIAGE (OUTPATIENT)
Dept: INTERNAL MEDICINE CLINIC | Facility: CLINIC | Age: 38
End: 2023-11-28

## 2023-11-28 DIAGNOSIS — R30.0 DYSURIA: Primary | ICD-10-CM

## 2023-11-28 LAB
BILIRUB UR QL: NEGATIVE
COLOR UR: YELLOW
GLUCOSE UR-MCNC: NORMAL MG/DL
KETONES UR-MCNC: NEGATIVE MG/DL
LEUKOCYTE ESTERASE UR QL STRIP.AUTO: 500
PH UR: 6 [PH] (ref 5–8)
PROT UR-MCNC: 100 MG/DL
RBC #/AREA URNS AUTO: >10 /HPF
SP GR UR STRIP: 1.02 (ref 1–1.03)
UROBILINOGEN UR STRIP-ACNC: NORMAL
WBC #/AREA URNS AUTO: >50 /HPF
WBC CLUMPS UR QL AUTO: PRESENT /HPF

## 2023-11-28 NOTE — TELEPHONE ENCOUNTER
Please reply to pool: EM RN TRIAGE  Action Requested: Summary for Provider     []  Critical Lab, Recommendations Needed  [x] Need Additional Advice  []   FYI    [x]   Need Orders  [] Need Medications Sent to Pharmacy  []  Other     SUMMARY: Patient's brother contacts clinic reporting noticeable sediment in urine. In the past has required abx for UTI. He is a paraplegic and unable to feel any burning or physical urinary symptoms. Denies sweating or muscle spasticity, he is sensitive to cold. Denies metal status changes or fever. Some nausea. Requesting orders for urine testing. RN advised she would send message, patient to report to ER for any symptoms of infection as described above. Turenr verbalized understanding. Dr. Jatin Wright please advise.      Reason for call: Acute  Onset: Data Unavailable                       Reason for Disposition   All other urine symptoms    Protocols used: Urinary Symptoms-A-[place]

## 2023-11-28 NOTE — TELEPHONE ENCOUNTER
Brother Risa Conner notified with understanding. He will submit specimen today. Denies further questions.

## 2023-11-28 NOTE — TELEPHONE ENCOUNTER
Fyi-- Urinalysis ordered, mychart sent to patient, please call if not read, will treat/follow up depending on results.

## 2023-12-22 DIAGNOSIS — R25.2 SPASTICITY: ICD-10-CM

## 2023-12-22 DIAGNOSIS — G82.20 PARAPLEGIA AT T4 LEVEL (HCC): ICD-10-CM

## 2023-12-23 DIAGNOSIS — R25.2 SPASTICITY: ICD-10-CM

## 2023-12-23 DIAGNOSIS — G82.20 PARAPLEGIA AT T4 LEVEL (HCC): ICD-10-CM

## 2023-12-24 RX ORDER — DIAZEPAM 10 MG/1
10 TABLET ORAL EVERY 8 HOURS PRN
Qty: 90 TABLET | Refills: 0 | Status: SHIPPED | OUTPATIENT
Start: 2023-12-24

## 2023-12-25 RX ORDER — DIAZEPAM 10 MG/1
TABLET ORAL
Qty: 90 TABLET | Refills: 0 | OUTPATIENT
Start: 2023-12-25

## 2024-01-22 ENCOUNTER — MED REC SCAN ONLY (OUTPATIENT)
Facility: LOCATION | Age: 39
End: 2024-01-22

## 2024-01-23 ENCOUNTER — OFFICE VISIT (OUTPATIENT)
Facility: LOCATION | Age: 39
End: 2024-01-23

## 2024-01-23 VITALS
HEART RATE: 64 BPM | WEIGHT: 165 LBS | OXYGEN SATURATION: 97 % | BODY MASS INDEX: 23.1 KG/M2 | HEIGHT: 71 IN | DIASTOLIC BLOOD PRESSURE: 58 MMHG | SYSTOLIC BLOOD PRESSURE: 107 MMHG

## 2024-01-23 DIAGNOSIS — G82.20 PARAPLEGIA AT T4 LEVEL (HCC): ICD-10-CM

## 2024-01-23 DIAGNOSIS — R25.2 SPASTICITY: ICD-10-CM

## 2024-01-23 DIAGNOSIS — K21.9 GASTROESOPHAGEAL REFLUX DISEASE WITHOUT ESOPHAGITIS: ICD-10-CM

## 2024-01-23 DIAGNOSIS — L89.311 PRESSURE INJURY OF RIGHT BUTTOCK, STAGE 1: Primary | ICD-10-CM

## 2024-01-23 PROCEDURE — 99214 OFFICE O/P EST MOD 30 MIN: CPT | Performed by: INTERNAL MEDICINE

## 2024-01-23 PROCEDURE — 3008F BODY MASS INDEX DOCD: CPT | Performed by: INTERNAL MEDICINE

## 2024-01-23 PROCEDURE — 3078F DIAST BP <80 MM HG: CPT | Performed by: INTERNAL MEDICINE

## 2024-01-23 PROCEDURE — 3074F SYST BP LT 130 MM HG: CPT | Performed by: INTERNAL MEDICINE

## 2024-01-23 RX ORDER — DIAZEPAM 10 MG/1
10 TABLET ORAL EVERY 8 HOURS PRN
Qty: 90 TABLET | Refills: 0 | Status: SHIPPED | OUTPATIENT
Start: 2024-01-23

## 2024-01-23 RX ORDER — DIAZEPAM 10 MG/1
10 TABLET ORAL EVERY 8 HOURS PRN
Qty: 90 TABLET | Refills: 0 | Status: SHIPPED | OUTPATIENT
Start: 2024-03-23

## 2024-01-23 RX ORDER — SUCRALFATE 1 G/1
1 TABLET ORAL EVERY 8 HOURS PRN
Qty: 90 TABLET | Refills: 2 | Status: SHIPPED | OUTPATIENT
Start: 2024-01-23

## 2024-01-23 RX ORDER — DIAZEPAM 10 MG/1
10 TABLET ORAL EVERY 8 HOURS PRN
Qty: 90 TABLET | Refills: 0 | Status: SHIPPED | OUTPATIENT
Start: 2024-02-23

## 2024-01-23 NOTE — PROGRESS NOTES
INTERNAL MEDICINE OFFICE NOTE     Patient ID: Keyur Duckworth is a 38 year old male.  Today's Date: 01/23/24  Chief Complaint: Follow - Up (F/U On Medicine and sore on butt. )    HPI  1.  He has T4 paralysis with spastic paralysis, he is currently on diazepam 10 mg Q8 and doing well, no complaints with this medication.  He also follows with spine surgery and pain management, there is a pain stimulator that is being reprogrammed and if it does not work a revision will be done.  Neuro notes reviewed and appreciated.    2.  His main concern is that he has developed a wound on his right buttocks, he does not have any sensation but mother noticed this.  He is confined to a wheelchair.  There is no fevers and he denies any trauma.    3.  He has been having acid reflux that occurs periodically throughout the month, these typically last for a day or so, he has tried PPIs along with H2 blockers but has not had any improvement.  He is looking for something a bit stronger than Tums as this did not help either.          Diazepam. Reflux. Pressure ucer right.   Vitals:    01/23/24 1330   BP: 107/58   Pulse: 64   SpO2: 97%   Weight: 165 lb (74.8 kg)   Height: 5' 11\" (1.803 m)     body mass index is 23.01 kg/m².  BP Readings from Last 3 Encounters:   01/23/24 107/58   10/10/23 125/81   08/17/23 100/70     The ASCVD Risk score (Nia DK, et al., 2019) failed to calculate for the following reasons:    The 2019 ASCVD risk score is only valid for ages 40 to 79  Medications reviewed:  Current Outpatient Medications   Medication Sig Dispense Refill    [START ON 3/23/2024] diazePAM 10 MG Oral Tab Take 1 tablet (10 mg total) by mouth every 8 (eight) hours as needed (spastic paralysis and anxiety). 90 tablet 0    [START ON 2/23/2024] diazePAM 10 MG Oral Tab Take 1 tablet (10 mg total) by mouth every 8 (eight) hours as needed (spastic paralysis and anxiety). 90 tablet 0    diazePAM 10 MG Oral Tab Take 1 tablet (10  mg total) by mouth every 8 (eight) hours as needed (spastic paralysis and anxiety). 90 tablet 0    mupirocin 2 % External Ointment Apply 1 Application topically 3 (three) times daily for 5 days. 22 g 1    sucralfate 1 g Oral Tab Take 1 tablet (1 g total) by mouth every 8 (eight) hours as needed (acid reflux). 90 tablet 2    Cholecalciferol (VITAMIN D) 125 MCG (5000 UT) Oral Cap Take 1 capsule (5,000 Units total) by mouth 2 (two) times a day for 30 days, THEN 1 capsule (5,000 Units total) daily. THEN CONTINUE 5000 international units daily.. 150 capsule 0    mirtazapine 30 MG Oral Tab Take 1 tablet (30 mg total) by mouth nightly.      Solifenacin Succinate 10 MG Oral Tab Take 1 tablet (10 mg total) by mouth daily.      prazosin 1 MG Oral Cap Take 1 capsule (1 mg total) by mouth nightly.      escitalopram 10 MG Oral Tab Take 1 tablet (10 mg total) by mouth daily. Managed per psych      fexofenadine (ALLERGY RELIEF) 180 MG Oral Tab Take 1 tablet (180 mg total) by mouth nightly. 90 tablet 3    Tri-Mix Double Strength (PPP) Injection Solution Inject 1 mL as directed as needed.     Inject intracavernosally as directed prior to intercourse     Tri-Mix Double Strength Recipe:  - Prostaglandin E1 11.8 ug/ml  - Papaverine HCI 18mg/ ml  - Phentolamine Mesylate 0.6 mg/ ml 8 mL 1    Azelastine HCl 137 MCG/SPRAY Nasal Solution 1 spray by Nasal route 2 (two) times a day. FOR SINUS SYMPTOMS/NASAL CONGESTION. 30 mL 3    Cholecalciferol (VITAMIN D-3) 1000 units Oral Cap Take 1 capsule by mouth.      HYDROcodone-acetaminophen  MG Oral Tab 1 tablet every 4 (four) hours as needed.  0    OxyCODONE HCl IR 15 MG Oral Tab Take 1 tablet (15 mg total) by mouth every 8 (eight) hours as needed.      OxyCODONE HCl IR 30 MG Oral Tab Take 1 tablet (30 mg total) by mouth every 8 (eight) hours as needed for Pain.           Assessment & Plan    1. Pressure injury of right buttock, stage 1 (Primary)  -     Mupirocin; Apply 1 Application  topically 3 (three) times daily for 5 days.  Dispense: 22 g; Refill: 1  -     [facility] WOUND EVAL  2. Paraplegia at T4 level (HCC)  -     diazePAM; Take 1 tablet (10 mg total) by mouth every 8 (eight) hours as needed (spastic paralysis and anxiety).  Dispense: 90 tablet; Refill: 0  -     diazePAM; Take 1 tablet (10 mg total) by mouth every 8 (eight) hours as needed (spastic paralysis and anxiety).  Dispense: 90 tablet; Refill: 0  -     diazePAM; Take 1 tablet (10 mg total) by mouth every 8 (eight) hours as needed (spastic paralysis and anxiety).  Dispense: 90 tablet; Refill: 0  3. Spasticity  -     diazePAM; Take 1 tablet (10 mg total) by mouth every 8 (eight) hours as needed (spastic paralysis and anxiety).  Dispense: 90 tablet; Refill: 0  -     diazePAM; Take 1 tablet (10 mg total) by mouth every 8 (eight) hours as needed (spastic paralysis and anxiety).  Dispense: 90 tablet; Refill: 0  -     diazePAM; Take 1 tablet (10 mg total) by mouth every 8 (eight) hours as needed (spastic paralysis and anxiety).  Dispense: 90 tablet; Refill: 0  4. Gastroesophageal reflux disease without esophagitis  -     Sucralfate; Take 1 tablet (1 g total) by mouth every 8 (eight) hours as needed (acid reflux).  Dispense: 90 tablet; Refill: 2  PLan\    Recommend he start topical mupirocin, instructions given in AVS to provide skin barrier, he will follow-up with wound care to ensure that this heals appropriately given his paraplegia and confinement to wheelchair.  Continue with diazepam.  For the reflux continue with PPIs but can add in sucralfate as needed.  Whenever the wound is cleaned I would like them to take a photo and document these that we can see the progression.    Follow Up: Return for AS NEEDED/ IF SYMPTOMS WORSEN..         Objective: Results:   Physical Exam  Vitals and nursing note reviewed.   Constitutional:       General: He is not in acute distress.     Appearance: Normal appearance.   HENT:      Head: Normocephalic.       Right Ear: External ear normal.      Left Ear: External ear normal.   Eyes:      Extraocular Movements: Extraocular movements intact.      Conjunctiva/sclera: Conjunctivae normal.   Pulmonary:      Effort: Pulmonary effort is normal.   Musculoskeletal:      Cervical back: Normal range of motion and neck supple.   Skin:     Coloration: Skin is not jaundiced.      Comments: Limited physical exam performed, patient is confined to wheelchair and we do not have lift assist.  There is a superficial right gluteal pressure ulcer measuring about the size of a quarter, I do not note any discharge.   Neurological:      General: No focal deficit present.      Mental Status: He is alert and oriented to person, place, and time. Mental status is at baseline.   Psychiatric:         Mood and Affect: Mood normal.         Behavior: Behavior normal.        Reviewed:    Patient Active Problem List    Diagnosis    Pressure injury of right buttock, stage 1    Osteopenia determined by x-ray    Elevated liver enzymes    Recurrent UTI    Erectile disorder due to medical condition in male    Mixed anxiety depressive disorder    Migraine without aura and without status migrainosus, not intractable    Current smoker    Paraplegia at T4 level (HCC)    Chronic pain disorder    History of spinal fusion    Insomnia    Anxiety    History of spinal cord injury    Spinal cord injury    Neurogenic bladder    Neuropathic pain      No Known Allergies     Social History     Socioeconomic History    Marital status: Single   Tobacco Use    Smoking status: Every Day     Packs/day: 1     Types: Cigarettes     Passive exposure: Current    Smokeless tobacco: Never    Tobacco comments:     10 cigs   Vaping Use    Vaping Use: Never used   Substance and Sexual Activity    Alcohol use: No    Drug use: Yes     Types: Cannabis     Comment: Medical marijuana evryday   Social History Narrative    The patient uses the following assistive device(s):  wheelchair.      The  patient does live in a home with stairs.      Review of Systems  All other systems negative unless otherwise stated in ROS or HPI above.       Murtaza Lin MD  Internal Medicine       Call office with any questions or seek emergency care if necessary.   Patient understands and agrees to follow directions and advice.      ----------------------------------------- PATIENT INSTRUCTIONS-----------------------------------------   .    Patient Instructions   1.  Please  bag balm, this comes in a green-tan and this is your Vaseline to keep the skin nice and smooth.  2.  I will give you mupirocin topical ointment and this will help prevent any infection  3.  Whenever you are getting the wound addressed or change I would like for you to take a photo on your phone so we can document them, you do not have to send them but this way I can take a look at them.  4.  Please also use Bordeaux's Butt paste, this is actually used for diaper rash but this contains zinc which is an antibacterial and will help to heal up the skin.  You may apply the antibiotic and then apply the Bordeaux's on top.  5.  I would like for you to  nonadherent dressing known as Telfa, this is available at the pharmacy and keep the wound covered with this material.  Do not use any gauze that has a we have as this will get stuck in the wound.  6.  I will give you a referral to see the wound care clinic but you do not need to see them right now, if this keeps getting worse the referral is there and we will simply have you go.    7.  If you notice that this is becoming worse please send me a photo send me a message and I will send you oral antibiotics, you do not need a visit with me for that.

## 2024-01-23 NOTE — PATIENT INSTRUCTIONS
1.  Please  bag balm, this comes in a green-tan and this is your Vaseline to keep the skin nice and smooth.  2.  I will give you mupirocin topical ointment and this will help prevent any infection  3.  Whenever you are getting the wound addressed or change I would like for you to take a photo on your phone so we can document them, you do not have to send them but this way I can take a look at them.  4.  Please also use Bordeaux's Butt paste, this is actually used for diaper rash but this contains zinc which is an antibacterial and will help to heal up the skin.  You may apply the antibiotic and then apply the Bordeaux's on top.  5.  I would like for you to  nonadherent dressing known as Telfa, this is available at the pharmacy and keep the wound covered with this material.  Do not use any gauze that has a we have as this will get stuck in the wound.  6.  I will give you a referral to see the wound care clinic but you do not need to see them right now, if this keeps getting worse the referral is there and we will simply have you go.    7.  If you notice that this is becoming worse please send me a photo send me a message and I will send you oral antibiotics, you do not need a visit with me for that.

## 2024-01-24 ENCOUNTER — TELEPHONE (OUTPATIENT)
Facility: LOCATION | Age: 39
End: 2024-01-24

## 2024-01-24 PROBLEM — L89.311 PRESSURE INJURY OF RIGHT BUTTOCK, STAGE 1: Status: ACTIVE | Noted: 2024-01-24

## 2024-01-24 NOTE — TELEPHONE ENCOUNTER
pt stated that he was in to see you yesterday and he has pickup the mupirocin 2 % External Ointment  but he thought and he is not sure that there was going to be something else that he needed to apply per pt \"something like silver\" Please advise.

## 2024-01-26 ENCOUNTER — TELEPHONE (OUTPATIENT)
Dept: INTERNAL MEDICINE CLINIC | Facility: CLINIC | Age: 39
End: 2024-01-26

## 2024-01-26 DIAGNOSIS — L89.311 PRESSURE INJURY OF RIGHT BUTTOCK, STAGE 1: Primary | ICD-10-CM

## 2024-01-26 NOTE — TELEPHONE ENCOUNTER
RN contacted patient.  Previously prescribed by dermatologist.  2 boils adjacent to buttock wound.  Currently not draining but has used successfully in the past.  RN does not see promogran orderable in epic but will check with Dr. Lin to see if DME or paper order can be placed.  Please advise.  Copied from internet:    Creww™ Promogran™ Collagen Matrix with ORC    Creww  [email or web address].com › ... › Collagen Dressings  promogran from [email or web address].com  3M™ Promogran™ Collagen Matrix with ORC is comprised of a sterile freeze-dried composite of 45% oxidized regenerated cellulose (ORC) and 55% collagen.

## 2024-01-30 ENCOUNTER — TELEPHONE (OUTPATIENT)
Facility: LOCATION | Age: 39
End: 2024-01-30

## 2024-01-30 NOTE — TELEPHONE ENCOUNTER
Use telfa nonadherent dressing with the mupirocin antibiotic. Can use paper tape to keep in place  Can use tegaderm with the silicone pad as well but unsure of where to find it.   Unfortunately these are all OTC and not covered by insurance.   Goal is to keep the area covered but not required to be sealed as this is a superficial ulcer and should breath.

## 2024-01-30 NOTE — TELEPHONE ENCOUNTER
Spoke with pt,  verified, pt is looking for RX for promogram wound dressing   Pt stated he was seen by Dr Lin on  24 for wound eval on his right buttocks, pt has ulcer and a boil next to it.    Pt stated he will need 1 box (12 dressing).   Promogran wound dressing was originally Rx'd by his derm but he can't get hold of  his derm,  this was Rx'd years ago.   Pt is paralized from stomach down.   Pls send Rx to [facility] in [place].   pls advise, thanks in advance.   Unable to pull promogran wound dressing from epic system.             Future Appointments   Date Time Provider Department Center   2024  1:30 PM Karley López MD [id number]

## 2024-01-30 NOTE — TELEPHONE ENCOUNTER
Spoke with pt,  verified  He was informed of below message, pt stated he got the box of wound dressing from his derm, it was a sample.   Pt wants to know is there any dressing he can use?   pls advise, thanks in advance.

## 2024-01-30 NOTE — TELEPHONE ENCOUNTER
Spoke with Carl Hanley at [facility], [facility] verified  He stated they don't have any record of promogram dressing on pt profile, he advised pt get this dressing on line or can get Tegaderm OTC.   He stated pt insurance will not cover dressing as most of them are OTC, we'll inform pt.

## 2024-02-22 DIAGNOSIS — G82.20 PARAPLEGIA AT T4 LEVEL (HCC): ICD-10-CM

## 2024-02-22 DIAGNOSIS — R25.2 SPASTICITY: ICD-10-CM

## 2024-02-22 RX ORDER — DIAZEPAM 10 MG/1
10 TABLET ORAL EVERY 8 HOURS PRN
Qty: 90 TABLET | Refills: 0 | OUTPATIENT
Start: 2024-02-22

## 2024-02-22 NOTE — TELEPHONE ENCOUNTER
Patient calling to ask for update on this script. Supposedly the pharmacy sent multiple messages to us, I only found this one.    Patient needs refill asap, he will be out completely tomorrow    Please call patient when refill is sent.   66M w/ severe MR with L Trimalleolar Ankle Fracture Dislocation  Medical Optimization for OR  Analgesia  OR today for ORIF of L ankle  Hold chemical AC ppx for OR today  NWB LLE  NPO for OR today  IVF while NPO  FU CT LLE  FU Labs  Discussed with attending, Dr. Smith and will advise if plan changes

## 2024-02-23 DIAGNOSIS — R25.2 SPASTICITY: ICD-10-CM

## 2024-02-23 DIAGNOSIS — G82.20 PARAPLEGIA AT T4 LEVEL (HCC): ICD-10-CM

## 2024-02-23 NOTE — TELEPHONE ENCOUNTER
Patient calling to state pharmacy stated they did not receive authorization from pharmacy for diazepam, asking to be resent to pharmacy. Asking for call back.

## 2024-02-24 DIAGNOSIS — G82.20 PARAPLEGIA AT T4 LEVEL (HCC): ICD-10-CM

## 2024-02-24 DIAGNOSIS — R25.2 SPASTICITY: ICD-10-CM

## 2024-02-24 RX ORDER — DIAZEPAM 10 MG/1
10 TABLET ORAL EVERY 8 HOURS PRN
Qty: 90 TABLET | Refills: 0 | OUTPATIENT
Start: 2024-02-24

## 2024-02-24 RX ORDER — DIAZEPAM 10 MG/1
10 TABLET ORAL EVERY 8 HOURS PRN
Qty: 90 TABLET | Refills: 2 | Status: SHIPPED | OUTPATIENT
Start: 2024-03-23

## 2024-02-24 NOTE — TELEPHONE ENCOUNTER
Noted, I will place refills on this instead, chronic long term use, patient is compliant, no concerns for abuse, has T4 paraplegia and spastic paralysis.

## 2024-02-24 NOTE — TELEPHONE ENCOUNTER
Per pt pharmacy does not see the March prescription for diazepam and since he had issues with the 2/23/24 prescription he wants to make sure no glitches for next.  Per pharmacist they don't have the 3/23/24 prescription.  Dr Lin do you want to resend or have pt request refill when due.  Please advise.

## 2024-02-24 NOTE — TELEPHONE ENCOUNTER
Per medication list 2/23/24 Diazepam script was sent to pharmacy on 1/23/24.  Spoke with pharmacist and pharmacist states she can't see the prescription in their system.    Pt is very frustrated and upset.  This RN called in the approved prescription to pharmacist    Pt asking to see prescription and asking to text it to him.  Unable to text due to HIPAA.  Pt agrees to have it sent through my chart.

## 2024-02-27 ENCOUNTER — TELEPHONE (OUTPATIENT)
Facility: LOCATION | Age: 39
End: 2024-02-27

## 2024-02-27 NOTE — TELEPHONE ENCOUNTER
Per pharmacy PA is needed for the following medication.          diazePAM 10 MG Oral Tab, Take 1 tablet (10 mg total) by mouth every 8 (eight) hours as needed (spastic paralysis and anxiety)., Disp: 90 tablet, Rfl: 0      Key: [id number]  Patient last name: Donita  : [birth year]

## 2024-03-19 ENCOUNTER — TELEPHONE (OUTPATIENT)
Dept: INTERNAL MEDICINE CLINIC | Facility: CLINIC | Age: 39
End: 2024-03-19

## 2024-03-19 NOTE — TELEPHONE ENCOUNTER
Patient calling to ask if forms were received from Dr. Michael Wright's office about required testing for surgery, could not clarify if needed medical clearance.

## 2024-03-22 ENCOUNTER — TELEPHONE (OUTPATIENT)
Facility: LOCATION | Age: 39
End: 2024-03-22

## 2024-03-22 NOTE — TELEPHONE ENCOUNTER
Patient called office. Date of birth and full name both confirmed.   Asking if order has been placed for the diazepam.   Advised patient order is active, and start date is tomorrow 3/23/24  Encouraged to contact pharmacy      Disp Refills Start End    diazePAM 10 MG Oral Tab 90 tablet 2 3/23/2024 --    Sig - Route: Take 1 tablet (10 mg total) by mouth every 8 (eight) hours as needed (spastic paralysis and anxiety). - Oral    Sent to pharmacy as: diazePAM 10 MG Oral Tablet (Valium)    E-Prescribing Status: Receipt confirmed by pharmacy (2/24/2024  4:40 PM CST)    No prior authorization was found for this prescription.    Found prior authorization for another prescription for the same medication: Approved        He verbalizes understanding of all information, and agreeable to plan.

## 2024-03-29 ENCOUNTER — OFFICE VISIT (OUTPATIENT)
Facility: LOCATION | Age: 39
End: 2024-03-29

## 2024-03-29 VITALS
SYSTOLIC BLOOD PRESSURE: 109 MMHG | HEART RATE: 82 BPM | DIASTOLIC BLOOD PRESSURE: 69 MMHG | HEIGHT: 71 IN | WEIGHT: 165 LBS | OXYGEN SATURATION: 98 % | BODY MASS INDEX: 23.1 KG/M2

## 2024-03-29 DIAGNOSIS — K62.89 ANAL SPHINCTER INCOMPETENCE: ICD-10-CM

## 2024-03-29 DIAGNOSIS — K21.9 GASTROESOPHAGEAL REFLUX DISEASE WITHOUT ESOPHAGITIS: Primary | ICD-10-CM

## 2024-03-29 DIAGNOSIS — R10.9 ABDOMINAL DISCOMFORT: ICD-10-CM

## 2024-03-29 DIAGNOSIS — Z01.818 PRE-OPERATIVE EXAMINATION: ICD-10-CM

## 2024-03-29 DIAGNOSIS — G82.20 PARAPLEGIA AT T4 LEVEL (HCC): ICD-10-CM

## 2024-03-29 PROCEDURE — 99213 OFFICE O/P EST LOW 20 MIN: CPT | Performed by: INTERNAL MEDICINE

## 2024-03-29 RX ORDER — PANTOPRAZOLE SODIUM 40 MG/1
40 TABLET, DELAYED RELEASE ORAL
Qty: 90 TABLET | Refills: 4 | Status: SHIPPED | OUTPATIENT
Start: 2024-03-29

## 2024-03-29 NOTE — PROGRESS NOTES
INTERNAL MEDICINE OFFICE NOTE     Patient ID: Keyur Duckworth is a 38 year old male.  Today's Date: 03/29/24  Chief Complaint: Stomach Pain (Pt here for Stomach Issues and states that they have been going on for 3 weeks now )    HPI  Pleasant 38-year-old gentleman presents for stomach discomfort- He is paraplegic and does not have any sensation below T4 but describes this as a growling sensation. .  He states for the past 3 weeks he has been having some burping and burning sensation associate with some diarrhea.  This is worsened by acidic and vinegar He foods.  There is no early satiety.  He does have a spinal stimulator and this is planning to be readjusted and is currently being readjusted, he does have weak anal tone.        Vitals:    03/29/24 1034   BP: 109/69   Pulse: 82   SpO2: 98%   Weight: 165 lb (74.8 kg)   Height: 5' 11\" (1.803 m)     body mass index is 23.01 kg/m².  BP Readings from Last 3 Encounters:   03/29/24 109/69   01/23/24 107/58   10/10/23 125/81     The ASCVD Risk score (Nia DK, et al., 2019) failed to calculate for the following reasons:    The 2019 ASCVD risk score is only valid for ages 40 to 79  Medications reviewed:  Current Outpatient Medications   Medication Sig Dispense Refill    pantoprazole 40 MG Oral Tab EC Take 1 tablet (40 mg total) by mouth every morning before breakfast. START AFTER YOU SUBMIT THE H PYLORI BREATH TEST. 90 tablet 4    diazePAM 10 MG Oral Tab Take 1 tablet (10 mg total) by mouth every 8 (eight) hours as needed (spastic paralysis and anxiety). 90 tablet 2    diazePAM 10 MG Oral Tab Take 1 tablet (10 mg total) by mouth every 8 (eight) hours as needed (spastic paralysis and anxiety). 90 tablet 0    diazePAM 10 MG Oral Tab Take 1 tablet (10 mg total) by mouth every 8 (eight) hours as needed (spastic paralysis and anxiety). 90 tablet 0    sucralfate 1 g Oral Tab Take 1 tablet (1 g total) by mouth every 8 (eight) hours as needed (acid reflux).  90 tablet 2    mirtazapine 30 MG Oral Tab Take 1 tablet (30 mg total) by mouth nightly.      Solifenacin Succinate 10 MG Oral Tab Take 1 tablet (10 mg total) by mouth daily.      prazosin 1 MG Oral Cap Take 1 capsule (1 mg total) by mouth nightly.      escitalopram 10 MG Oral Tab Take 1 tablet (10 mg total) by mouth daily. Managed per psych      fexofenadine (ALLERGY RELIEF) 180 MG Oral Tab Take 1 tablet (180 mg total) by mouth nightly. 90 tablet 3    Tri-Mix Double Strength (PPP) Injection Solution Inject 1 mL as directed as needed.     Inject intracavernosally as directed prior to intercourse     Tri-Mix Double Strength Recipe:  - Prostaglandin E1 11.8 ug/ml  - Papaverine HCI 18mg/ ml  - Phentolamine Mesylate 0.6 mg/ ml 8 mL 1    Azelastine HCl 137 MCG/SPRAY Nasal Solution 1 spray by Nasal route 2 (two) times a day. FOR SINUS SYMPTOMS/NASAL CONGESTION. 30 mL 3    Cholecalciferol (VITAMIN D-3) 1000 units Oral Cap Take 1 capsule by mouth.      HYDROcodone-acetaminophen  MG Oral Tab 1 tablet every 4 (four) hours as needed.  0    OxyCODONE HCl IR 15 MG Oral Tab Take 1 tablet (15 mg total) by mouth every 8 (eight) hours as needed.      OxyCODONE HCl IR 30 MG Oral Tab Take 1 tablet (30 mg total) by mouth every 8 (eight) hours as needed for Pain.      Cholecalciferol (VITAMIN D) 125 MCG (5000 UT) Oral Cap Take 1 capsule (5,000 Units total) by mouth 2 (two) times a day for 30 days, THEN 1 capsule (5,000 Units total) daily. THEN CONTINUE 5000 international units daily.. 150 capsule 0         Assessment & Plan    1. Gastroesophageal reflux disease without esophagitis (Primary)  -     Pantoprazole Sodium; Take 1 tablet (40 mg total) by mouth every morning before breakfast. START AFTER YOU SUBMIT THE H PYLORI BREATH TEST.  Dispense: 90 tablet; Refill: 4  -     Helicobacter Pylori Breath Test; Future; Expected date: 03/29/2024  -     Gastro Referral - In Network  2. Abdominal discomfort  -     Gastro Referral - In  Network  3. Pre-operative examination  -     Comp Metabolic Panel (14); Future; Expected date: 03/29/2024  -     CBC With Differential With Platelet; Future; Expected date: 03/29/2024  -     Urinalysis with Culture Reflex  4. Anal sphincter incompetence  -     Gastro Referral - In Network  5. Paraplegia at T4 level (HCC)  -     Gastro Referral - In Network  Plan  Trial of ppi after submitting hpylori, labs as above for preop, if any abnormalities or if symptoms fail to improve low threshold for CT abd pelvis with contrast due to paraplegia.       Follow Up: No follow-ups on file..         Objective: Results:   Physical Exam  Vitals and nursing note reviewed.   Constitutional:       General: He is not in acute distress.     Appearance: Normal appearance.   HENT:      Head: Normocephalic.      Right Ear: External ear normal.      Left Ear: External ear normal.   Eyes:      Extraocular Movements: Extraocular movements intact.      Conjunctiva/sclera: Conjunctivae normal.   Pulmonary:      Effort: Pulmonary effort is normal.   Abdominal:      General: Bowel sounds are normal.      Palpations: There is no mass.      Hernia: No hernia is present.      Comments: No sensation- chronic, unchanged   Musculoskeletal:         General: Normal range of motion.      Cervical back: Normal range of motion and neck supple.   Skin:     Coloration: Skin is not jaundiced.   Neurological:      General: No focal deficit present.      Mental Status: He is alert and oriented to person, place, and time. Mental status is at baseline.   Psychiatric:         Mood and Affect: Mood normal.         Behavior: Behavior normal.        Reviewed:    Patient Active Problem List    Diagnosis    Gastroesophageal reflux disease without esophagitis    Anal sphincter incompetence    Pressure injury of right buttock, stage 1    Osteopenia determined by x-ray    Recurrent UTI    Erectile disorder due to medical condition in male    Migraine without aura and  without status migrainosus, not intractable    Current smoker    Paraplegia at T4 level (HCC)    History of spinal fusion    Insomnia    Anxiety    Neurogenic bladder    Neuropathic pain      No Known Allergies     Social History     Socioeconomic History    Marital status: Single   Tobacco Use    Smoking status: Every Day     Packs/day: 1     Types: Cigarettes     Passive exposure: Current    Smokeless tobacco: Never    Tobacco comments:     10 cigs   Vaping Use    Vaping Use: Never used   Substance and Sexual Activity    Alcohol use: No    Drug use: Yes     Types: Cannabis     Comment: Medical marijuana evryday   Social History Narrative    The patient uses the following assistive device(s):  wheelchair.      The patient does live in a home with stairs.      Review of Systems  All other systems negative unless otherwise stated in ROS or HPI above.       Murtaza Lin MD  Internal Medicine       Call office with any questions or seek emergency care if necessary.   Patient understands and agrees to follow directions and advice.      ----------------------------------------- PATIENT INSTRUCTIONS-----------------------------------------   .    Patient Instructions   1.  I have ordered a helical back to pylori breath test, this will be done at the [facility] location at your earliest convenience however you cannot eat brush her teeth and you must be fasting for this test.  I will go ahead and set you up, we can do it through the drive-through if you do not have to get out of the car.  2.  Once you have submitted this breath test then you may start taking pantoprazole 40 mg daily, this is an acid reducing medication that gets absorbed into your body and the idea is that this will reduce the acidity and hopefully improve your stomach issues.  The maximum dose is 40 mg twice a day so if you do not have much improvement in 2 weeks then let me know and we will double it.  If pantoprazole is not covered by her insurance  then please purchase any over-the-counter proton pump inhibitor, check with the pharmacist and you will take 1 of those tablets twice daily.  3.  I am going to have you see a gastroenterologist, there is another possibility that you have something called small intestinal bacterial overgrowth but unfortunately I do not have the ability to order the test for this.  The GI physician will discuss with you whether or not we should proceed with an endoscopy or if we should proceed with other testing such as a CT of your abdomen and pelvis with contrast.  I would like to first see how this does before putting you through radiation.  My hope is that within the next few weeks you start to notice improvement, it can take a few months to have full resolution but the idea is if you do not feel better at the maximum dose of pantoprazole we will have you scoped by the GI doctor.  4.  At the same time we can have you do anal manometry, this will tell the tone of the sphincter and how much pressure is there, this is something that the GI physician will have to discuss with you and there are treatment options.    I would prefer not to give you anything that slows down your gut or your stool but you can use Imodium because I do not believe this is an infection but be aware if we overdo it you will get constipated.  When you are going out feel free to use some Imodium.

## 2024-03-29 NOTE — PATIENT INSTRUCTIONS
1.  I have ordered a helical back to pylori breath test, this will be done at the [place] location at your earliest convenience however you cannot eat brush her teeth and you must be fasting for this test.  I will go ahead and set you up, we can do it through the drive-through if you do not have to get out of the car.  2.  Once you have submitted this breath test then you may start taking pantoprazole 40 mg daily, this is an acid reducing medication that gets absorbed into your body and the idea is that this will reduce the acidity and hopefully improve your stomach issues.  The maximum dose is 40 mg twice a day so if you do not have much improvement in 2 weeks then let me know and we will double it.  If pantoprazole is not covered by her insurance then please purchase any over-the-counter proton pump inhibitor, check with the pharmacist and you will take 1 of those tablets twice daily.  3.  I am going to have you see a gastroenterologist, there is another possibility that you have something called small intestinal bacterial overgrowth but unfortunately I do not have the ability to order the test for this.  The GI physician will discuss with you whether or not we should proceed with an endoscopy or if we should proceed with other testing such as a CT of your abdomen and pelvis with contrast.  I would like to first see how this does before putting you through radiation.  My hope is that within the next few weeks you start to notice improvement, it can take a few months to have full resolution but the idea is if you do not feel better at the maximum dose of pantoprazole we will have you scoped by the GI doctor.  4.  At the same time we can have you do anal manometry, this will tell the tone of the sphincter and how much pressure is there, this is something that the GI physician will have to discuss with you and there are treatment options.    I would prefer not to give you anything that slows down your gut or  your stool but you can use Imodium because I do not believe this is an infection but be aware if we overdo it you will get constipated.  When you are going out feel free to use some Imodium.

## 2024-03-31 PROBLEM — K62.89 ANAL SPHINCTER INCOMPETENCE: Status: ACTIVE | Noted: 2024-03-31

## 2024-03-31 PROBLEM — F41.8 MIXED ANXIETY DEPRESSIVE DISORDER: Status: RESOLVED | Noted: 2022-08-10 | Resolved: 2024-03-31

## 2024-03-31 PROBLEM — G89.4 CHRONIC PAIN DISORDER: Status: RESOLVED | Noted: 2017-04-13 | Resolved: 2024-03-31

## 2024-03-31 PROBLEM — K21.9 GASTROESOPHAGEAL REFLUX DISEASE WITHOUT ESOPHAGITIS: Status: ACTIVE | Noted: 2024-03-31

## 2024-03-31 PROBLEM — R74.8 ELEVATED LIVER ENZYMES: Status: RESOLVED | Noted: 2023-08-21 | Resolved: 2024-03-31

## 2024-04-01 ENCOUNTER — TELEPHONE (OUTPATIENT)
Facility: LOCATION | Age: 39
End: 2024-04-01

## 2024-04-01 NOTE — TELEPHONE ENCOUNTER
Patient calling asking where he can go to have the H Pylori testing done. He is asking if he has to go all the way to [place] location for this, I advised he can get this done at any [facility] lab location, patient prefers [facility] and will go there tomorrow.     No other questions or concerns at this time.

## 2024-04-02 ENCOUNTER — LAB ENCOUNTER (OUTPATIENT)
Dept: LAB | Age: 39
End: 2024-04-02
Attending: INTERNAL MEDICINE
Payer: MEDICAID

## 2024-04-02 DIAGNOSIS — Z01.818 PRE-OPERATIVE EXAMINATION: ICD-10-CM

## 2024-04-02 DIAGNOSIS — K21.9 GASTROESOPHAGEAL REFLUX DISEASE WITHOUT ESOPHAGITIS: ICD-10-CM

## 2024-04-02 DIAGNOSIS — R74.8 ELEVATED LIVER ENZYMES: ICD-10-CM

## 2024-04-02 LAB
ALBUMIN SERPL-MCNC: 4.5 G/DL (ref 3.2–4.8)
ALBUMIN/GLOB SERPL: 1.7 {RATIO} (ref 1–2)
ALP LIVER SERPL-CCNC: 109 U/L
ALT SERPL-CCNC: 128 U/L
ANION GAP SERPL CALC-SCNC: 5 MMOL/L (ref 0–18)
AST SERPL-CCNC: 51 U/L (ref ?–34)
BASOPHILS # BLD AUTO: 0.03 X10(3) UL (ref 0–0.2)
BASOPHILS NFR BLD AUTO: 0.3 %
BILIRUB DIRECT SERPL-MCNC: 0.1 MG/DL (ref ?–0.3)
BILIRUB SERPL-MCNC: 0.4 MG/DL (ref 0.3–1.2)
BILIRUB UR QL: NEGATIVE
BUN BLD-MCNC: 11 MG/DL (ref 9–23)
BUN/CREAT SERPL: 25.6 (ref 10–20)
CALCIUM BLD-MCNC: 9.4 MG/DL (ref 8.7–10.4)
CHLORIDE SERPL-SCNC: 113 MMOL/L (ref 98–112)
CLARITY UR: CLEAR
CO2 SERPL-SCNC: 22 MMOL/L (ref 21–32)
CREAT BLD-MCNC: 0.43 MG/DL
DEPRECATED RDW RBC AUTO: 44 FL (ref 35.1–46.3)
EGFRCR SERPLBLD CKD-EPI 2021: 141 ML/MIN/1.73M2 (ref 60–?)
EOSINOPHIL # BLD AUTO: 0.37 X10(3) UL (ref 0–0.7)
EOSINOPHIL NFR BLD AUTO: 4 %
ERYTHROCYTE [DISTWIDTH] IN BLOOD BY AUTOMATED COUNT: 13 % (ref 11–15)
FASTING STATUS PATIENT QL REPORTED: YES
GLOBULIN PLAS-MCNC: 2.6 G/DL (ref 2.8–4.4)
GLUCOSE BLD-MCNC: 89 MG/DL (ref 70–99)
GLUCOSE UR-MCNC: NORMAL MG/DL
HAV AB SER QL IA: REACTIVE
HAV IGM SER QL: NONREACTIVE
HBV CORE AB SERPL QL IA: NONREACTIVE
HBV SURFACE AB SER QL: REACTIVE
HBV SURFACE AB SERPL IA-ACNC: 171.45 MIU/ML
HBV SURFACE AG SERPL QL IA: NONREACTIVE
HCT VFR BLD AUTO: 39.6 %
HCV AB SERPL QL IA: NONREACTIVE
HGB BLD-MCNC: 13.3 G/DL
HGB UR QL STRIP.AUTO: NEGATIVE
IMM GRANULOCYTES # BLD AUTO: 0.02 X10(3) UL (ref 0–1)
IMM GRANULOCYTES NFR BLD: 0.2 %
KETONES UR-MCNC: NEGATIVE MG/DL
LEUKOCYTE ESTERASE UR QL STRIP.AUTO: 25
LYMPHOCYTES # BLD AUTO: 3.29 X10(3) UL (ref 1–4)
LYMPHOCYTES NFR BLD AUTO: 35.3 %
MCH RBC QN AUTO: 31.1 PG (ref 26–34)
MCHC RBC AUTO-ENTMCNC: 33.6 G/DL (ref 31–37)
MCV RBC AUTO: 92.5 FL
MONOCYTES # BLD AUTO: 0.6 X10(3) UL (ref 0.1–1)
MONOCYTES NFR BLD AUTO: 6.4 %
NEUTROPHILS # BLD AUTO: 5.02 X10 (3) UL (ref 1.5–7.7)
NEUTROPHILS # BLD AUTO: 5.02 X10(3) UL (ref 1.5–7.7)
NEUTROPHILS NFR BLD AUTO: 53.8 %
OSMOLALITY SERPL CALC.SUM OF ELEC: 289 MOSM/KG (ref 275–295)
PH UR: 6.5 [PH] (ref 5–8)
PLATELET # BLD AUTO: 273 10(3)UL (ref 150–450)
POTASSIUM SERPL-SCNC: 4.1 MMOL/L (ref 3.5–5.1)
PROT SERPL-MCNC: 7.1 G/DL (ref 5.7–8.2)
PROT UR-MCNC: NEGATIVE MG/DL
RBC # BLD AUTO: 4.28 X10(6)UL
SODIUM SERPL-SCNC: 140 MMOL/L (ref 136–145)
SP GR UR STRIP: 1.01 (ref 1–1.03)
UROBILINOGEN UR STRIP-ACNC: NORMAL
WBC # BLD AUTO: 9.3 X10(3) UL (ref 4–11)

## 2024-04-02 PROCEDURE — 86706 HEP B SURFACE ANTIBODY: CPT

## 2024-04-02 PROCEDURE — 86708 HEPATITIS A ANTIBODY: CPT

## 2024-04-02 PROCEDURE — 86704 HEP B CORE ANTIBODY TOTAL: CPT

## 2024-04-02 PROCEDURE — 87340 HEPATITIS B SURFACE AG IA: CPT

## 2024-04-02 PROCEDURE — 81001 URINALYSIS AUTO W/SCOPE: CPT | Performed by: INTERNAL MEDICINE

## 2024-04-02 PROCEDURE — 85025 COMPLETE CBC W/AUTO DIFF WBC: CPT

## 2024-04-02 PROCEDURE — 80053 COMPREHEN METABOLIC PANEL: CPT

## 2024-04-02 PROCEDURE — 83013 H PYLORI (C-13) BREATH: CPT

## 2024-04-02 PROCEDURE — 36415 COLL VENOUS BLD VENIPUNCTURE: CPT

## 2024-04-02 PROCEDURE — 86803 HEPATITIS C AB TEST: CPT

## 2024-04-02 PROCEDURE — 82248 BILIRUBIN DIRECT: CPT

## 2024-04-02 PROCEDURE — 86709 HEPATITIS A IGM ANTIBODY: CPT

## 2024-04-02 PROCEDURE — 87086 URINE CULTURE/COLONY COUNT: CPT | Performed by: INTERNAL MEDICINE

## 2024-04-02 PROCEDURE — 80503 PATH CLIN CONSLTJ SF 5-20: CPT

## 2024-04-03 LAB — H PYLORI BREATH TEST: NEGATIVE

## 2024-04-08 ENCOUNTER — OFFICE VISIT (OUTPATIENT)
Facility: LOCATION | Age: 39
End: 2024-04-08

## 2024-04-08 ENCOUNTER — TELEPHONE (OUTPATIENT)
Dept: INTERNAL MEDICINE CLINIC | Facility: CLINIC | Age: 39
End: 2024-04-08

## 2024-04-08 VITALS
DIASTOLIC BLOOD PRESSURE: 85 MMHG | WEIGHT: 165 LBS | HEART RATE: 82 BPM | SYSTOLIC BLOOD PRESSURE: 137 MMHG | OXYGEN SATURATION: 98 % | BODY MASS INDEX: 23.1 KG/M2 | HEIGHT: 71 IN

## 2024-04-08 DIAGNOSIS — Z98.1 HISTORY OF SPINAL FUSION: ICD-10-CM

## 2024-04-08 DIAGNOSIS — G89.4 CHRONIC PAIN DISORDER: ICD-10-CM

## 2024-04-08 DIAGNOSIS — Z01.818 PRE-OPERATIVE EXAMINATION: Primary | ICD-10-CM

## 2024-04-08 DIAGNOSIS — M79.2 NEUROPATHIC PAIN: ICD-10-CM

## 2024-04-08 DIAGNOSIS — G82.20 PARAPLEGIA AT T4 LEVEL (HCC): ICD-10-CM

## 2024-04-08 DIAGNOSIS — N31.9 NEUROGENIC BLADDER: ICD-10-CM

## 2024-04-08 PROCEDURE — 93000 ELECTROCARDIOGRAM COMPLETE: CPT | Performed by: INTERNAL MEDICINE

## 2024-04-08 PROCEDURE — 99214 OFFICE O/P EST MOD 30 MIN: CPT | Performed by: INTERNAL MEDICINE

## 2024-04-08 NOTE — PROGRESS NOTES
INTERNAL MEDICINE OFFICE NOTE     Patient ID: Keyur Duckworth is a 38 year old male.  Today's Date: 04/08/24  Chief Complaint: Pre-Op Exam      Keyur Duckworth presents for preoperative clearance for: revision of implantable pulse generator  Performing Physician: Dr NORAH Shah  Date of surgery: 4/10/24  Elective or emergency: elective- BMP, EKG.   Pre-operative forms provided: received 4/5/24 at 9:20am by central triage, next available appt add on given 4/8/24.     #SURGICAL CLEARANCE: OKAY to proceed with surgical intervention if benefits outweigh risks at time of procedure. Patient instructed to follow all pre and post surgical advice. Surgeon and anesthesiology physicians to make final decision at time of procedure based upon patients clinical status.      Current complaints, if any:   Back pain  T4 paraplegia     Active medical problems:   Patient Active Problem List   Diagnosis    Paraplegia at T4 level (HCC)    Anxiety    History of spinal fusion    Insomnia    Neurogenic bladder    Neuropathic pain    Migraine without aura and without status migrainosus, not intractable    Current smoker    Erectile disorder due to medical condition in male    Recurrent UTI    Osteopenia determined by x-ray    Pressure injury of right buttock, stage 1    Gastroesophageal reflux disease without esophagitis    Anal sphincter incompetence         Cervical/neck:   - Arthritis: Patient denies.   - Neck pain: Patient denies.   - Difficulty with ROM: Patient denies.   - Prior neck injury/surgery: Patient denies.     Pulmonary:   - Smoker: Yes  - Apnea/CPAP:Patient denies.   - Asthma/COPD:Patient denies.   - Difficulty laying flat for extended period of time: Patient denies.   - Dyspnea/exertional: Patient denies.   - Respiratory Medications: Patient denies.     Cardiac:  - History of ischemic coronary disease: Patient denies.   - History of heart failure: Patient denies.   - Heart attack in past 90  days: Patient denies.   - Chest pain in last 90 days: Patient denies.   - History of Arrhythmia:  Patient denies.   - History of stroke or TIA:Patient denies.   - Diabetes/A1C: Last A1c value was 5.9% done 8/2/2023.      - Anticoagulation/Warfarin/ASA/NOAC: Patient denies.   - Echo/stress testing if available:Patient denies.       Functional Capacity:   Perform ADLs- eating, dress, toilet (1 METs)? Yes, patient can perform.   Walk up flight of steps, hill, walk ground level 3-4mph (4 METs)? N/A paraplegia  Perform heavy housework- scrubbing floors, move heavy furniture, climb 2 flights of stairs (4-10 METs)? N/A paraplegia  Participate in strenuous sports- swimming, singles tennis, football, basketball, ski (+10 METs)? N/A paraplegia    Specialist clearances, if any:  None    Pertinent Labs and Imaging reviewed:           Vitals:    04/08/24 1418   BP: 137/85   Pulse: 82   SpO2: 98%   Weight: 165 lb (74.8 kg)   Height: 5' 11\" (1.803 m)     body mass index is 23.01 kg/m².  BP Readings from Last 3 Encounters:   04/09/24 127/83   04/08/24 137/85   03/29/24 109/69     The ASCVD Risk score (Nia GRANT, et al., 2019) failed to calculate for the following reasons:    The 2019 ASCVD risk score is only valid for ages 40 to 79  Medications reviewed:  Current Outpatient Medications   Medication Sig Dispense Refill    pantoprazole 40 MG Oral Tab EC Take 1 tablet (40 mg total) by mouth every morning before breakfast. START AFTER YOU SUBMIT THE H PYLORI BREATH TEST. 90 tablet 4    diazePAM 10 MG Oral Tab Take 1 tablet (10 mg total) by mouth every 8 (eight) hours as needed (spastic paralysis and anxiety). 90 tablet 2    diazePAM 10 MG Oral Tab Take 1 tablet (10 mg total) by mouth every 8 (eight) hours as needed (spastic paralysis and anxiety). 90 tablet 0    sucralfate 1 g Oral Tab Take 1 tablet (1 g total) by mouth every 8 (eight) hours as needed (acid reflux). 90 tablet 2    mirtazapine 30 MG Oral Tab Take 1 tablet (30 mg total)  by mouth nightly.      Solifenacin Succinate 10 MG Oral Tab Take 1 tablet (10 mg total) by mouth daily.      prazosin 1 MG Oral Cap Take 1 capsule (1 mg total) by mouth nightly.      escitalopram 10 MG Oral Tab Take 1 tablet (10 mg total) by mouth daily. Managed per psych      fexofenadine (ALLERGY RELIEF) 180 MG Oral Tab Take 1 tablet (180 mg total) by mouth nightly. 90 tablet 3    Tri-Mix Double Strength (PPP) Injection Solution Inject 1 mL as directed as needed.     Inject intracavernosally as directed prior to intercourse     Tri-Mix Double Strength Recipe:  - Prostaglandin E1 11.8 ug/ml  - Papaverine HCI 18mg/ ml  - Phentolamine Mesylate 0.6 mg/ ml 8 mL 1    Azelastine HCl 137 MCG/SPRAY Nasal Solution 1 spray by Nasal route 2 (two) times a day. FOR SINUS SYMPTOMS/NASAL CONGESTION. 30 mL 3    Cholecalciferol (VITAMIN D-3) 1000 units Oral Cap Take 1 capsule by mouth.      HYDROcodone-acetaminophen  MG Oral Tab 1 tablet every 4 (four) hours as needed.  0    OxyCODONE HCl IR 15 MG Oral Tab Take 1 tablet (15 mg total) by mouth every 8 (eight) hours as needed.      OxyCODONE HCl IR 30 MG Oral Tab Take 1 tablet (30 mg total) by mouth every 8 (eight) hours as needed for Pain.      Cholecalciferol (VITAMIN D) 125 MCG (5000 UT) Oral Cap Take 1 capsule (5,000 Units total) by mouth 2 (two) times a day for 30 days, THEN 1 capsule (5,000 Units total) daily. THEN CONTINUE 5000 international units daily.. 150 capsule 0         Assessment & Plan    1. Pre-operative examination (Primary)  -     Cancel: Basic Metabolic Panel (8); Future; Expected date: 04/08/2024  -     EKG In-Office [[id number]]  -     EKG 12 Lead; Future; Expected date: 04/08/2024  2. Paraplegia at T4 level (HCC)  3. Chronic pain disorder  4. Neuropathic pain  5. History of spinal fusion  6. Neurogenic bladder  Plan  Pleasant 38-year-old gentleman presents for preop evaluation for revision of implantable pulse generator under MAC anesthesia.  He is supposed  undergo procedure on Wednesday.  He has greater than 2 METS of activity, he is limited by being wheelchair-bound.  He has no cardiac history and denies any cardiac complaints.  He is a periodic smoker but denies any long-term pulmonary complaints, no clinical evidence of COPD.    Follow Up: Return for AS NEEDED/ IF SYMPTOMS WORSEN..         Objective: Results:   Physical Exam  Vitals and nursing note reviewed.   Constitutional:       General: He is not in acute distress.     Appearance: Normal appearance.   HENT:      Head: Normocephalic.      Right Ear: External ear normal.      Left Ear: External ear normal.   Eyes:      Extraocular Movements: Extraocular movements intact.      Conjunctiva/sclera: Conjunctivae normal.   Cardiovascular:      Rate and Rhythm: Normal rate and regular rhythm.      Pulses: Normal pulses.      Heart sounds: Normal heart sounds.   Pulmonary:      Effort: Pulmonary effort is normal.   Musculoskeletal:      Cervical back: Normal range of motion and neck supple.   Skin:     Coloration: Skin is not jaundiced.   Neurological:      General: No focal deficit present.      Mental Status: He is alert and oriented to person, place, and time. Mental status is at baseline.   Psychiatric:         Mood and Affect: Mood normal.         Behavior: Behavior normal.        Reviewed:    Patient Active Problem List    Diagnosis    Gastroesophageal reflux disease without esophagitis    Anal sphincter incompetence    Pressure injury of right buttock, stage 1    Osteopenia determined by x-ray    Recurrent UTI    Erectile disorder due to medical condition in male    Migraine without aura and without status migrainosus, not intractable    Current smoker    Paraplegia at T4 level ([facility])    History of spinal fusion    Insomnia    Anxiety    Neurogenic bladder    Neuropathic pain      No Known Allergies     Social History     Socioeconomic History    Marital status: Single   Tobacco Use    Smoking status: Every Day      Current packs/day: 1.00     Types: Cigarettes     Passive exposure: Current    Smokeless tobacco: Never    Tobacco comments:     10 cigs   Vaping Use    Vaping status: Never Used   Substance and Sexual Activity    Alcohol use: No    Drug use: Yes     Types: Cannabis     Comment: Medical marijuana evryday   Social History Narrative    The patient uses the following assistive device(s):  wheelchair.      The patient does live in a home with stairs.      Review of Systems  All other systems negative unless otherwise stated in ROS or HPI above.       Murtaza Lin MD  Internal Medicine       Call office with any questions or seek emergency care if necessary.   Patient understands and agrees to follow directions and advice.      ----------------------------------------- PATIENT INSTRUCTIONS-----------------------------------------   .    There are no Patient Instructions on file for this visit.

## 2024-04-08 NOTE — TELEPHONE ENCOUNTER
Please fax the patient's office notes from his pre op appointment to:    Fax- [phone]     Surgery is scheduled for 4/10/24.

## 2024-04-08 NOTE — TELEPHONE ENCOUNTER
The surgeon never sent us any pre-op documentation until Friday.  They will need to be patient for pre-op. In future they should send pre-op requests same day patient is informed he needs surgery.

## 2024-04-09 ENCOUNTER — OFFICE VISIT (OUTPATIENT)
Dept: ENDOCRINOLOGY CLINIC | Facility: CLINIC | Age: 39
End: 2024-04-09

## 2024-04-09 VITALS
SYSTOLIC BLOOD PRESSURE: 127 MMHG | WEIGHT: 178 LBS | HEIGHT: 71 IN | HEART RATE: 79 BPM | DIASTOLIC BLOOD PRESSURE: 83 MMHG | BODY MASS INDEX: 24.92 KG/M2

## 2024-04-09 DIAGNOSIS — M81.8 OTHER OSTEOPOROSIS WITHOUT CURRENT PATHOLOGICAL FRACTURE: Primary | ICD-10-CM

## 2024-04-09 LAB
ATRIAL RATE: 73 BPM
P AXIS: 46 DEGREES
P-R INTERVAL: 172 MS
Q-T INTERVAL: 372 MS
QRS DURATION: 88 MS
QTC CALCULATION (BEZET): 409 MS
R AXIS: 63 DEGREES
T AXIS: 33 DEGREES
VENTRICULAR RATE: 73 BPM

## 2024-04-09 PROCEDURE — 99204 OFFICE O/P NEW MOD 45 MIN: CPT | Performed by: INTERNAL MEDICINE
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
## 2024-04-26 PROBLEM — M81.8 OTHER OSTEOPOROSIS WITHOUT CURRENT PATHOLOGICAL FRACTURE: Status: ACTIVE | Noted: 2024-04-26

## 2024-06-03 ENCOUNTER — LAB ENCOUNTER (OUTPATIENT)
Dept: LAB | Age: 39
End: 2024-06-03
Attending: INTERNAL MEDICINE
Payer: MEDICAID

## 2024-06-03 ENCOUNTER — NURSE TRIAGE (OUTPATIENT)
Facility: LOCATION | Age: 39
End: 2024-06-03

## 2024-06-03 DIAGNOSIS — R82.90 CLOUDY URINE: Primary | ICD-10-CM

## 2024-06-03 NOTE — TELEPHONE ENCOUNTER
Patient viewed [employer] message:  From  Murtaza Lin MD To  Keyur Duckworth Sent and Delivered  6/3/2024 11:36 AM   Last Read in [employer]  6/3/2024 12:38 PM by Keyur Duckworth

## 2024-06-03 NOTE — TELEPHONE ENCOUNTER
Action Requested: Summary for Provider     []  Critical Lab, Recommendations Needed  [x] Need Additional Advice  []   FYI    [x]   Need Orders  [] Need Medications Sent to Pharmacy  []  Other     SUMMARY: Patient is asking for a message to be sent to Dr. Lin to request that urine lab test be ordered to test his urine without having to come in to the office for an appointment.     He states that his brother can drop the specimen off at the lab.    Lab order pended for your approval if appropriate.    Verified name and .    Patient states that he has had cloudy urine, intermittent fevers, and some nausea and vomiting over the past week.    He states that these symptoms are same as when he has had a urinary tract infections in the past- he states that he gets recurrent urinary tract infections since he is paralyzed from the chest down and uses a urinary catheter.    Reason for call: Acute  Onset: Data Unavailable        Reason for Disposition   Cloudy urine, present < 24 hours    Protocols used: Urinary Catheter (e.g., Yang) Symptoms and [id number]

## 2024-06-04 ENCOUNTER — TELEPHONE (OUTPATIENT)
Facility: LOCATION | Age: 39
End: 2024-06-04

## 2024-06-04 NOTE — TELEPHONE ENCOUNTER
Patient called, verified Name and . States his primary care provider gave him a prescription for antibiotic. He followed up with pharmacy approximately 15 minutes ago and was told that they did not receive the prescription.    Called [facility], verified patient's Name and . Confirmed that they have the prescription and will fill today. Patient notified and updated.

## 2024-06-20 DIAGNOSIS — G82.20 PARAPLEGIA AT T4 LEVEL (HCC): ICD-10-CM

## 2024-06-20 DIAGNOSIS — R25.2 SPASTICITY: ICD-10-CM

## 2024-06-20 RX ORDER — DIAZEPAM 10 MG/1
10 TABLET ORAL EVERY 8 HOURS PRN
Qty: 90 TABLET | Refills: 2 | Status: SHIPPED | OUTPATIENT
Start: 2024-06-20

## 2024-06-20 NOTE — TELEPHONE ENCOUNTER
Recent Fills: 03/23/2024, 04/22/2024, 05/20/2024    Last Rx Written: 03/23/2024    Last Office Visit: 04/08/2024

## 2024-06-20 NOTE — TELEPHONE ENCOUNTER
Patient is requesting diazePAM 10 MG Oral Tab. Running out only has two days worth     [id number] - [place] - [address] AT [facility] [address], [phone], [phone]

## 2024-07-02 ENCOUNTER — OFFICE VISIT (OUTPATIENT)
Facility: LOCATION | Age: 39
End: 2024-07-02

## 2024-07-02 VITALS
SYSTOLIC BLOOD PRESSURE: 125 MMHG | DIASTOLIC BLOOD PRESSURE: 72 MMHG | OXYGEN SATURATION: 96 % | HEIGHT: 71 IN | HEART RATE: 94 BPM | BODY MASS INDEX: 23.1 KG/M2 | WEIGHT: 165 LBS

## 2024-07-02 DIAGNOSIS — N31.9 NEUROGENIC BLADDER: ICD-10-CM

## 2024-07-02 DIAGNOSIS — R82.90 CLOUDY URINE: Primary | ICD-10-CM

## 2024-07-02 DIAGNOSIS — M81.8 OTHER OSTEOPOROSIS WITHOUT CURRENT PATHOLOGICAL FRACTURE: ICD-10-CM

## 2024-07-02 DIAGNOSIS — G83.9 SPASTIC PARALYSIS (HCC): ICD-10-CM

## 2024-07-02 DIAGNOSIS — N39.0 RECURRENT UTI: ICD-10-CM

## 2024-07-02 DIAGNOSIS — F41.9 ANXIETY: ICD-10-CM

## 2024-07-02 PROCEDURE — 99214 OFFICE O/P EST MOD 30 MIN: CPT | Performed by: INTERNAL MEDICINE

## 2024-07-02 RX ORDER — BACLOFEN 5 MG/1
TABLET ORAL 3 TIMES DAILY PRN
Qty: 90 TABLET | Refills: 1 | Status: SHIPPED | OUTPATIENT
Start: 2024-07-02 | End: 2024-08-01

## 2024-07-02 RX ORDER — DIAZEPAM 5 MG/5ML
10 SOLUTION ORAL EVERY 8 HOURS
Qty: 300 ML | Refills: 2 | Status: SHIPPED | OUTPATIENT
Start: 2024-07-02 | End: 2024-08-01

## 2024-07-02 RX ORDER — MIRTAZAPINE 30 MG/1
30 TABLET, FILM COATED ORAL NIGHTLY
Qty: 90 TABLET | Refills: 5 | Status: SHIPPED | OUTPATIENT
Start: 2024-07-02

## 2024-07-02 RX ORDER — SOLIFENACIN SUCCINATE 10 MG/1
10 TABLET, FILM COATED ORAL DAILY
Qty: 90 TABLET | Refills: 3 | Status: SHIPPED | OUTPATIENT
Start: 2024-07-02

## 2024-07-02 RX ORDER — ESCITALOPRAM OXALATE 10 MG/1
10 TABLET ORAL DAILY
Qty: 90 TABLET | Refills: 5 | Status: SHIPPED | OUTPATIENT
Start: 2024-07-02

## 2024-07-02 RX ORDER — PRAZOSIN HYDROCHLORIDE 1 MG/1
1 CAPSULE ORAL NIGHTLY
Qty: 90 CAPSULE | Refills: 4 | Status: SHIPPED | OUTPATIENT
Start: 2024-07-02

## 2024-07-02 NOTE — PROGRESS NOTES
INTERNAL MEDICINE OFFICE NOTE     Patient ID: Keyur Duckworth is a 38 year old male.  Today's Date: 07/02/24  Chief Complaint: Medication Request (Patient here for Refill. )    HPI  Pleasant 38-year-old gentleman with neurogenic bladder presents for UTI.  He was seen 6/3/2024 with symptoms of UTI,culture grew, pansensitive except ampicillin, was rx bactrim, here for follow up. Asymptomatic due to t4 paraplegia.      50,000-99,000 CFU/ML Escherichia coli Abnormal       10,000 - 50,000 CFU/ML Pseudomonas aeruginosa Abnormal      He was also found to have osteopenia on DEXA scan, he followed up with endocrine, notes reviewed and appreciated, numerous lab tests have been ordered to ensure that there is no other cause other than that the paraplegia and patient to follow-up depending on results.    He has significant painful spasms in the morning lasting 30-45 minutes, takes diazepam but not helping much.     Reviewed medications.     Doing well on lexapro and mirtazapine.       Vitals:    07/02/24 1128   BP: 125/72   Pulse: 94   SpO2: 96%   Weight: 165 lb (74.8 kg)   Height: 5' 11\" (1.803 m)     body mass index is 23.01 kg/m².  BP Readings from Last 3 Encounters:   07/02/24 125/72   04/09/24 127/83   04/08/24 137/85     The ASCVD Risk score (Nia DK, et al., 2019) failed to calculate for the following reasons:    The 2019 ASCVD risk score is only valid for ages 40 to 79  Medications reviewed:  Current Outpatient Medications   Medication Sig Dispense Refill    mirtazapine 30 MG Oral Tab Take 1 tablet (30 mg total) by mouth nightly. 90 tablet 5    escitalopram 10 MG Oral Tab Take 1 tablet (10 mg total) by mouth daily. FOR ANXIETY. 90 tablet 5    baclofen 5 MG Oral Tab Take 1-2 tablets (5-10 mg total) by mouth 3 (three) times daily as needed (for muscle spasm). 90 tablet 1    Solifenacin Succinate 10 MG Oral Tab Take 1 tablet (10 mg total) by mouth daily. 90 tablet 3    prazosin 1 MG Oral Cap Take  1 capsule (1 mg total) by mouth nightly. 90 capsule 4    diazePAM 5 MG/5ML Oral Solution Take 10 mg by mouth every 8 (eight) hours. FOR SPASMS. STOP TABLETS OF DIAZEPAM. 300 mL 2    pantoprazole 40 MG Oral Tab EC Take 1 tablet (40 mg total) by mouth every morning before breakfast. START AFTER YOU SUBMIT THE H PYLORI BREATH TEST. 90 tablet 4    sucralfate 1 g Oral Tab Take 1 tablet (1 g total) by mouth every 8 (eight) hours as needed (acid reflux). 90 tablet 2    fexofenadine (ALLERGY RELIEF) 180 MG Oral Tab Take 1 tablet (180 mg total) by mouth nightly. 90 tablet 3    Tri-Mix Double Strength (PPP) Injection Solution Inject 1 mL as directed as needed.     Inject intracavernosally as directed prior to intercourse     Tri-Mix Double Strength Recipe:  - Prostaglandin E1 11.8 ug/ml  - Papaverine HCI 18mg/ ml  - Phentolamine Mesylate 0.6 mg/ ml 8 mL 1    Azelastine HCl 137 MCG/SPRAY Nasal Solution 1 spray by Nasal route 2 (two) times a day. FOR SINUS SYMPTOMS/NASAL CONGESTION. 30 mL 3    Cholecalciferol (VITAMIN D-3) 1000 units Oral Cap Take 1 capsule by mouth.      OxyCODONE HCl IR 15 MG Oral Tab Take 1 tablet (15 mg total) by mouth every 8 (eight) hours as needed.      OxyCODONE HCl IR 30 MG Oral Tab Take 1 tablet (30 mg total) by mouth every 8 (eight) hours as needed for Pain.           Assessment & Plan    1. Cloudy urine (Primary)  -     Urinalysis, Routine; Future; Expected date: 07/02/2024  -     Urine Culture, Routine; Future; Expected date: 07/02/2024  2. Other osteoporosis without current pathological fracture  3. Recurrent UTI  -     Urology Referral - In Network  4. Neurogenic bladder  -     Urology Referral - In Network  -     Solifenacin Succinate; Take 1 tablet (10 mg total) by mouth daily.  Dispense: 90 tablet; Refill: 3  -     Prazosin HCl; Take 1 capsule (1 mg total) by mouth nightly.  Dispense: 90 capsule; Refill: 4  5. Anxiety  -     Psychiatry Referral - In Network  -     Mirtazapine; Take 1 tablet  (30 mg total) by mouth nightly.  Dispense: 90 tablet; Refill: 5  -     Escitalopram Oxalate; Take 1 tablet (10 mg total) by mouth daily. FOR ANXIETY.  Dispense: 90 tablet; Refill: 5  6. Spastic paralysis (HCC)  -     Baclofen; Take 1-2 tablets (5-10 mg total) by mouth 3 (three) times daily as needed (for muscle spasm).  Dispense: 90 tablet; Refill: 1  -     diazePAM; Take 10 mg by mouth every 8 (eight) hours. FOR SPASMS. STOP TABLETS OF DIAZEPAM.  Dispense: 300 mL; Refill: 2  Plan  Overall doing well today, meds refilled, however he is having lots of spasms in the morning and diazepam is no longer helping, will see if taking baclofen at night provide some relief and I will switch the tablet diazepam to liquid to see if he can sublingual absorption to have this work a bit faster than taking it orally.  He will discuss with his pharmacist.    Follow Up: Return for ANNUAL EXAM..         Objective: Results:   Physical Exam  Vitals and nursing note reviewed.   Constitutional:       General: He is not in acute distress.     Appearance: Normal appearance.   HENT:      Head: Normocephalic.      Right Ear: External ear normal.      Left Ear: External ear normal.   Eyes:      Extraocular Movements: Extraocular movements intact.      Conjunctiva/sclera: Conjunctivae normal.   Pulmonary:      Effort: Pulmonary effort is normal.   Musculoskeletal:      Cervical back: Normal range of motion and neck supple.   Skin:     Coloration: Skin is not jaundiced.   Neurological:      General: No focal deficit present.      Mental Status: He is alert and oriented to person, place, and time. Mental status is at baseline.   Psychiatric:         Mood and Affect: Mood normal.         Behavior: Behavior normal.        Reviewed:    Patient Active Problem List    Diagnosis    Other osteoporosis without current pathological fracture    Gastroesophageal reflux disease without esophagitis    Anal sphincter incompetence    Pressure injury of right  buttock, stage 1    Osteopenia determined by x-ray    Recurrent UTI    Erectile disorder due to medical condition in male    Migraine without aura and without status migrainosus, not intractable    Current smoker    Paraplegia at T4 level (HCC)    History of spinal fusion    Insomnia    Anxiety    Neurogenic bladder    Neuropathic pain      No Known Allergies     Social History     Socioeconomic History    Marital status: Single   Tobacco Use    Smoking status: Every Day     Current packs/day: 1.00     Types: Cigarettes     Passive exposure: Current    Smokeless tobacco: Never    Tobacco comments:     10 cigs   Vaping Use    Vaping status: Never Used   Substance and Sexual Activity    Alcohol use: No    Drug use: Yes     Types: Cannabis     Comment: Medical marijuana evryday   Social History Narrative    The patient uses the following assistive device(s):  wheelchair.      The patient does live in a home with stairs.      Review of Systems  All other systems negative unless otherwise stated in ROS or HPI above.       Murtaza Lin MD  Internal Medicine       Call office with any questions or seek emergency care if necessary.   Patient understands and agrees to follow directions and advice.      ----------------------------------------- PATIENT INSTRUCTIONS-----------------------------------------   .    There are no Patient Instructions on file for this visit.

## 2024-07-08 ENCOUNTER — TELEPHONE (OUTPATIENT)
Age: 39
End: 2024-07-08

## 2024-07-08 NOTE — TELEPHONE ENCOUNTER
I am reaching out from the [facility] Navigation department, following up on an order from your provider's office to assist in connecting you with resources for care. If you would like to discuss this further, please give us a call back at [phone], or for more immediate assistance you can contact our 24-hour help line at [phone]. We look forward to hearing from you soon.

## 2024-07-10 ENCOUNTER — TELEPHONE (OUTPATIENT)
Dept: SURGERY | Facility: CLINIC | Age: 39
End: 2024-07-10

## 2024-07-10 NOTE — TELEPHONE ENCOUNTER
Called patient's phone number,brother picked up phone, verified name and  of Keyur. His brother says he just wanted to let us know of his conditions so there are no surprises. I let brother know there are no issues in regarding of using wheelchair for his appointment.   Patient agreed, verbalized understandings and has no further questions.

## 2024-07-10 NOTE — TELEPHONE ENCOUNTER
Patient is scheduled for 7/29 at 2:40 pm and indicates he is paralyzed from the waist down, wheelchair bound. Patient wants to ensure this is not a concern, he will have a family member wheel him in, thanks.

## 2024-07-16 ENCOUNTER — TELEPHONE (OUTPATIENT)
Age: 39
End: 2024-07-16

## 2024-07-16 NOTE — TELEPHONE ENCOUNTER
[facility]  [address] [place] [address]  Phone: [phone]  [email or web address]     [facility]  [address] [facility] [address]  Phone: [phone]  [email or web address]     [facility] [place] [place]  [address]  [facility] [address]  Phone: [phone]  [email or web address]     [facility]  [address]  [facility] [address]  Phone: [phone]  [email or web address]

## 2024-07-17 ENCOUNTER — NURSE TRIAGE (OUTPATIENT)
Dept: INTERNAL MEDICINE CLINIC | Facility: CLINIC | Age: 39
End: 2024-07-17

## 2024-07-17 NOTE — TELEPHONE ENCOUNTER
Action Requested: Summary for Provider     []  Critical Lab, Recommendations Needed  [] Need Additional Advice  []   FYI    []   Need Orders  [] Need Medications Sent to Pharmacy  []  Other     SUMMARY: Patient reports chronic cough, however noticed when coughing up, has aftertaste of bile. Tastes like \"burned popcorn\". Denies chest pain or shortness of breath. Denies fever. Patient states brownish color sputum is new. No vomiting. Advised appointment today. Offered video visit due to cough.  Patient declined. States he is coming in for blood test tomorrow and would like to come tomorrow.  Appointment scheduled. Advised to wear a mask to appointment. Advised to monitory symptoms closely, if fever develops, to be seen sooner. Patient verbalized understanding.     Reason for call: Cough (Cough, aftertaste of bile)  Onset: Data Unavailable                       Future Appointments   Date Time Provider Department Center   7/18/2024 11:40 AM [facility]   7/29/2024  2:40 PM Kumar Brown MD [facility]       Reason for Disposition   Coughing up feli-colored (reddish-brown) or blood-tinged sputum    Protocols used: Cough-A-OH

## 2024-07-18 ENCOUNTER — OFFICE VISIT (OUTPATIENT)
Facility: LOCATION | Age: 39
End: 2024-07-18

## 2024-07-18 ENCOUNTER — LAB ENCOUNTER (OUTPATIENT)
Dept: LAB | Age: 39
End: 2024-07-18
Attending: INTERNAL MEDICINE
Payer: MEDICAID

## 2024-07-18 ENCOUNTER — HOSPITAL ENCOUNTER (OUTPATIENT)
Dept: GENERAL RADIOLOGY | Age: 39
Discharge: HOME | End: 2024-07-18
Attending: INTERNAL MEDICINE
Payer: MEDICAID

## 2024-07-18 VITALS
HEIGHT: 71 IN | WEIGHT: 165 LBS | BODY MASS INDEX: 23.1 KG/M2 | SYSTOLIC BLOOD PRESSURE: 133 MMHG | OXYGEN SATURATION: 98 % | HEART RATE: 97 BPM | DIASTOLIC BLOOD PRESSURE: 83 MMHG

## 2024-07-18 DIAGNOSIS — R05.8 PRODUCTIVE COUGH: ICD-10-CM

## 2024-07-18 DIAGNOSIS — G83.9 SPASTIC PARALYSIS (HCC): ICD-10-CM

## 2024-07-18 DIAGNOSIS — R91.1 LUNG NODULE: ICD-10-CM

## 2024-07-18 DIAGNOSIS — J18.9 PNEUMONIA DUE TO INFECTIOUS ORGANISM, UNSPECIFIED LATERALITY, UNSPECIFIED PART OF LUNG: ICD-10-CM

## 2024-07-18 DIAGNOSIS — Z13.228 SCREENING FOR ENDOCRINE, NUTRITIONAL, METABOLIC AND IMMUNITY DISORDER: ICD-10-CM

## 2024-07-18 DIAGNOSIS — Z13.21 SCREENING FOR ENDOCRINE, NUTRITIONAL, METABOLIC AND IMMUNITY DISORDER: ICD-10-CM

## 2024-07-18 DIAGNOSIS — R04.2 HEMOPTYSIS: Primary | ICD-10-CM

## 2024-07-18 DIAGNOSIS — M81.8 OTHER OSTEOPOROSIS WITHOUT CURRENT PATHOLOGICAL FRACTURE: ICD-10-CM

## 2024-07-18 DIAGNOSIS — R04.2 HEMOPTYSIS: ICD-10-CM

## 2024-07-18 DIAGNOSIS — Z13.29 SCREENING FOR ENDOCRINE, NUTRITIONAL, METABOLIC AND IMMUNITY DISORDER: ICD-10-CM

## 2024-07-18 DIAGNOSIS — Z13.0 SCREENING FOR ENDOCRINE, NUTRITIONAL, METABOLIC AND IMMUNITY DISORDER: ICD-10-CM

## 2024-07-18 DIAGNOSIS — J45.998 POST VIRAL ASTHMA (HCC): ICD-10-CM

## 2024-07-18 DIAGNOSIS — R82.90 CLOUDY URINE: ICD-10-CM

## 2024-07-18 LAB
ALBUMIN SERPL-MCNC: 4.9 G/DL (ref 3.2–4.8)
ALBUMIN/GLOB SERPL: 1.7 {RATIO} (ref 1–2)
ALP LIVER SERPL-CCNC: 183 U/L
ALT SERPL-CCNC: 95 U/L
ANION GAP SERPL CALC-SCNC: 7 MMOL/L (ref 0–18)
AST SERPL-CCNC: 39 U/L (ref ?–34)
BASOPHILS # BLD AUTO: 0.03 X10(3) UL (ref 0–0.2)
BASOPHILS NFR BLD AUTO: 0.2 %
BILIRUB SERPL-MCNC: 0.3 MG/DL (ref 0.3–1.2)
BILIRUB UR QL: NEGATIVE
BUN BLD-MCNC: 10 MG/DL (ref 9–23)
BUN/CREAT SERPL: 22.7 (ref 10–20)
CALCIUM BLD-MCNC: 9.6 MG/DL (ref 8.7–10.4)
CHLORIDE SERPL-SCNC: 111 MMOL/L (ref 98–112)
CLARITY UR: CLEAR
CO2 SERPL-SCNC: 24 MMOL/L (ref 21–32)
CORTIS SERPL-MCNC: 10 UG/DL
CREAT BLD-MCNC: 0.44 MG/DL
DEPRECATED RDW RBC AUTO: 44.4 FL (ref 35.1–46.3)
EGFRCR SERPLBLD CKD-EPI 2021: 140 ML/MIN/1.73M2 (ref 60–?)
EOSINOPHIL # BLD AUTO: 0.27 X10(3) UL (ref 0–0.7)
EOSINOPHIL NFR BLD AUTO: 2.1 %
ERYTHROCYTE [DISTWIDTH] IN BLOOD BY AUTOMATED COUNT: 13 % (ref 11–15)
FASTING STATUS PATIENT QL REPORTED: NO
FSH SERPL-ACNC: 8.5 MIU/ML
GLOBULIN PLAS-MCNC: 2.9 G/DL (ref 2–3.5)
GLUCOSE BLD-MCNC: 102 MG/DL (ref 70–99)
GLUCOSE UR-MCNC: NORMAL MG/DL
HCT VFR BLD AUTO: 38 %
HGB BLD-MCNC: 12.2 G/DL
HGB UR QL STRIP.AUTO: NEGATIVE
IMM GRANULOCYTES # BLD AUTO: 0.05 X10(3) UL (ref 0–1)
IMM GRANULOCYTES NFR BLD: 0.4 %
KETONES UR-MCNC: NEGATIVE MG/DL
LEUKOCYTE ESTERASE UR QL STRIP.AUTO: NEGATIVE
LH SERPL-ACNC: 1.8 MIU/ML
LYMPHOCYTES # BLD AUTO: 1.7 X10(3) UL (ref 1–4)
LYMPHOCYTES NFR BLD AUTO: 13.1 %
MCH RBC QN AUTO: 29.7 PG (ref 26–34)
MCHC RBC AUTO-ENTMCNC: 32.1 G/DL (ref 31–37)
MCV RBC AUTO: 92.5 FL
MONOCYTES # BLD AUTO: 1.24 X10(3) UL (ref 0.1–1)
MONOCYTES NFR BLD AUTO: 9.5 %
NEUTROPHILS # BLD AUTO: 9.73 X10 (3) UL (ref 1.5–7.7)
NEUTROPHILS # BLD AUTO: 9.73 X10(3) UL (ref 1.5–7.7)
NEUTROPHILS NFR BLD AUTO: 74.7 %
OSMOLALITY SERPL CALC.SUM OF ELEC: 293 MOSM/KG (ref 275–295)
PH UR: 7 [PH] (ref 5–8)
PHOSPHATE SERPL-MCNC: 4 MG/DL (ref 2.4–5.1)
PLATELET # BLD AUTO: 315 10(3)UL (ref 150–450)
POTASSIUM SERPL-SCNC: 3.9 MMOL/L (ref 3.5–5.1)
PROLACTIN SERPL-MCNC: 6.3 NG/ML
PROT SERPL-MCNC: 7.8 G/DL (ref 5.7–8.2)
PROT UR-MCNC: NEGATIVE MG/DL
PTH-INTACT SERPL-MCNC: 17.7 PG/ML (ref 18.5–88)
RBC # BLD AUTO: 4.11 X10(6)UL
SODIUM SERPL-SCNC: 142 MMOL/L (ref 136–145)
SP GR UR STRIP: 1.01 (ref 1–1.03)
T4 FREE SERPL-MCNC: 1.3 NG/DL (ref 0.8–1.7)
TSI SER-ACNC: 0.62 MIU/ML (ref 0.55–4.78)
UROBILINOGEN UR STRIP-ACNC: NORMAL
VIT D+METAB SERPL-MCNC: 36.9 NG/ML (ref 30–100)
WBC # BLD AUTO: 13 X10(3) UL (ref 4–11)

## 2024-07-18 PROCEDURE — 82024 ASSAY OF ACTH: CPT

## 2024-07-18 PROCEDURE — 83002 ASSAY OF GONADOTROPIN (LH): CPT

## 2024-07-18 PROCEDURE — 84146 ASSAY OF PROLACTIN: CPT

## 2024-07-18 PROCEDURE — 82533 TOTAL CORTISOL: CPT

## 2024-07-18 PROCEDURE — 81001 URINALYSIS AUTO W/SCOPE: CPT

## 2024-07-18 PROCEDURE — 84443 ASSAY THYROID STIM HORMONE: CPT

## 2024-07-18 PROCEDURE — 87086 URINE CULTURE/COLONY COUNT: CPT

## 2024-07-18 PROCEDURE — 84439 ASSAY OF FREE THYROXINE: CPT

## 2024-07-18 PROCEDURE — 87077 CULTURE AEROBIC IDENTIFY: CPT

## 2024-07-18 PROCEDURE — 83001 ASSAY OF GONADOTROPIN (FSH): CPT

## 2024-07-18 PROCEDURE — 83970 ASSAY OF PARATHORMONE: CPT

## 2024-07-18 PROCEDURE — 99214 OFFICE O/P EST MOD 30 MIN: CPT | Performed by: INTERNAL MEDICINE

## 2024-07-18 PROCEDURE — 85025 COMPLETE CBC W/AUTO DIFF WBC: CPT

## 2024-07-18 PROCEDURE — 84100 ASSAY OF PHOSPHORUS: CPT

## 2024-07-18 PROCEDURE — 86364 TISS TRNSGLTMNASE EA IG CLAS: CPT

## 2024-07-18 PROCEDURE — 87186 SC STD MICRODIL/AGAR DIL: CPT

## 2024-07-18 PROCEDURE — 82306 VITAMIN D 25 HYDROXY: CPT

## 2024-07-18 PROCEDURE — 71046 X-RAY EXAM CHEST 2 VIEWS: CPT | Performed by: INTERNAL MEDICINE

## 2024-07-18 PROCEDURE — 84080 ASSAY ALKALINE PHOSPHATASES: CPT

## 2024-07-18 PROCEDURE — 84305 ASSAY OF SOMATOMEDIN: CPT

## 2024-07-18 PROCEDURE — 84410 TESTOSTERONE BIOAVAILABLE: CPT

## 2024-07-18 PROCEDURE — 36415 COLL VENOUS BLD VENIPUNCTURE: CPT

## 2024-07-18 PROCEDURE — 80053 COMPREHEN METABOLIC PANEL: CPT

## 2024-07-18 RX ORDER — DOXYCYCLINE HYCLATE 100 MG/1
100 CAPSULE ORAL 2 TIMES DAILY WITH MEALS
Qty: 20 CAPSULE | Refills: 0 | Status: SHIPPED | OUTPATIENT
Start: 2024-07-18 | End: 2024-07-28

## 2024-07-18 RX ORDER — FLUTICASONE PROPIONATE AND SALMETEROL 250; 50 UG/1; UG/1
1 POWDER RESPIRATORY (INHALATION) EVERY 12 HOURS SCHEDULED
Qty: 3 EACH | Refills: 9 | Status: SHIPPED | OUTPATIENT
Start: 2024-07-18

## 2024-07-18 RX ORDER — AMOXICILLIN AND CLAVULANATE POTASSIUM 875; 125 MG/1; MG/1
1 TABLET, FILM COATED ORAL 2 TIMES DAILY
Qty: 20 TABLET | Refills: 0 | Status: SHIPPED | OUTPATIENT
Start: 2024-07-18 | End: 2024-07-28

## 2024-07-18 RX ORDER — PREDNISONE 20 MG/1
20 TABLET ORAL 2 TIMES DAILY WITH MEALS
Qty: 14 TABLET | Refills: 0 | Status: SHIPPED | OUTPATIENT
Start: 2024-07-18 | End: 2024-07-25

## 2024-07-18 RX ORDER — DIAZEPAM 5 MG/5ML
10 SOLUTION ORAL EVERY 8 HOURS
Qty: 300 ML | Refills: 2 | Status: SHIPPED | OUTPATIENT
Start: 2024-07-18 | End: 2024-08-17

## 2024-07-18 NOTE — PROGRESS NOTES
INTERNAL MEDICINE OFFICE NOTE     Patient ID: Keyur Duckowrth is a 38 year old male.  Today's Date: 07/18/24  Chief Complaint: Cough (Patient here for Coughing up Brown and been going on for 4 days now. )    Cough      38-year-old gentleman with paraplegia T4 who presents for cough.  He states he been coughing for the past couple days, worse at night, he is noticing periodic brown mucus that comes out, he states that taste like [employer] theater popcorn.  He is not having any other breathing issues, there is no pleuritic chest pain, no nosebleeding.  He has not tried any treatments.          Vitals:    07/18/24 1150   BP: 133/83   Pulse: 97   SpO2: 98%   Weight: 165 lb (74.8 kg)   Height: 5' 11\" (1.803 m)     body mass index is 23.01 kg/m².  BP Readings from Last 3 Encounters:   07/18/24 133/83   07/02/24 125/72   04/09/24 127/83     The ASCVD Risk score (Nia DK, et al., 2019) failed to calculate for the following reasons:    The 2019 ASCVD risk score is only valid for ages 40 to 79  Medications reviewed:  Current Outpatient Medications   Medication Sig Dispense Refill    amoxicillin clavulanate 875-125 MG Oral Tab Take 1 tablet by mouth 2 (two) times daily for 10 days. 20 tablet 0    diazePAM 5 MG/5ML Oral Solution Take 10 mg by mouth every 8 (eight) hours. FOR SPASMS. STOP TABLETS OF DIAZEPAM. Please fill early, should have been converted to liquid not given pills last fill. MD approved override- drkp 300 mL 2    fluticasone-salmeterol (WIXELA INHUB) 250-50 MCG/ACT Inhalation Aerosol Powder, Breath Activated Inhale 1 puff into the lungs every 12 (twelve) hours. FOR ASTHMA. 3 each 9    predniSONE 20 MG Oral Tab Take 1 tablet (20 mg total) by mouth 2 (two) times daily with meals for 7 days. 14 tablet 0    mirtazapine 30 MG Oral Tab Take 1 tablet (30 mg total) by mouth nightly. 90 tablet 5    escitalopram 10 MG Oral Tab Take 1 tablet (10 mg total) by mouth daily. FOR ANXIETY. 90 tablet 5     baclofen 5 MG Oral Tab Take 1-2 tablets (5-10 mg total) by mouth 3 (three) times daily as needed (for muscle spasm). 90 tablet 1    Solifenacin Succinate 10 MG Oral Tab Take 1 tablet (10 mg total) by mouth daily. 90 tablet 3    prazosin 1 MG Oral Cap Take 1 capsule (1 mg total) by mouth nightly. 90 capsule 4    pantoprazole 40 MG Oral Tab EC Take 1 tablet (40 mg total) by mouth every morning before breakfast. START AFTER YOU SUBMIT THE H PYLORI BREATH TEST. 90 tablet 4    sucralfate 1 g Oral Tab Take 1 tablet (1 g total) by mouth every 8 (eight) hours as needed (acid reflux). 90 tablet 2    fexofenadine (ALLERGY RELIEF) 180 MG Oral Tab Take 1 tablet (180 mg total) by mouth nightly. 90 tablet 3    Tri-Mix Double Strength (PPP) Injection Solution Inject 1 mL as directed as needed.     Inject intracavernosally as directed prior to intercourse     Tri-Mix Double Strength Recipe:  - Prostaglandin E1 11.8 ug/ml  - Papaverine HCI 18mg/ ml  - Phentolamine Mesylate 0.6 mg/ ml 8 mL 1    Azelastine HCl 137 MCG/SPRAY Nasal Solution 1 spray by Nasal route 2 (two) times a day. FOR SINUS SYMPTOMS/NASAL CONGESTION. 30 mL 3    Cholecalciferol (VITAMIN D-3) 1000 units Oral Cap Take 1 capsule by mouth.      OxyCODONE HCl IR 15 MG Oral Tab Take 1 tablet (15 mg total) by mouth every 8 (eight) hours as needed.      OxyCODONE HCl IR 30 MG Oral Tab Take 1 tablet (30 mg total) by mouth every 8 (eight) hours as needed for Pain.           Assessment & Plan    1. Hemoptysis (Primary)  -     XR CHEST PA + LAT CHEST (CPT=71046); Future; Expected date: 07/18/2024  2. Post viral asthma (HCC)  -     Fluticasone-Salmeterol; Inhale 1 puff into the lungs every 12 (twelve) hours. FOR ASTHMA.  Dispense: 3 each; Refill: 9  -     predniSONE; Take 1 tablet (20 mg total) by mouth 2 (two) times daily with meals for 7 days.  Dispense: 14 tablet; Refill: 0  3. Productive cough  -     Amoxicillin-Pot Clavulanate; Take 1 tablet by mouth 2 (two) times daily for  10 days.  Dispense: 20 tablet; Refill: 0  -     CBC With Differential With Platelet; Future; Expected date: 07/18/2024  4. Spastic paralysis (HCC)  -     diazePAM; Take 10 mg by mouth every 8 (eight) hours. FOR SPASMS. STOP TABLETS OF DIAZEPAM. Please fill early, should have been converted to liquid not given pills last fill. MD approved override- felipe  Dispense: 300 mL; Refill: 2  5. Screening for endocrine, nutritional, metabolic and immunity disorder  -     Comp Metabolic Panel (14); Future; Expected date: 07/18/2024  Plan  Get chest x-ray to evaluate for any pneumonia, I will start him on some prednisone and Advair, if there is any evidence of pneumonia then add in Augmentin or if his symptoms worsen then add in Augmentin, he will get labs done today which were previously ordered.  If there is evidence of pneumonia we will add in doxycycline to the Augmentin.  For the diazepam switch to liquid as he needs this to work rapidly, advised to ask pharmacist if there is a sublingual version that can dissolve instantly.     Follow Up: Return for DEPENDING ON RESULTS..         Objective: Results:   Physical Exam  Vitals and nursing note reviewed.   Constitutional:       General: He is not in acute distress.     Appearance: Normal appearance.   HENT:      Head: Normocephalic.      Right Ear: External ear normal.      Left Ear: External ear normal.      Nose: Congestion present.      Mouth/Throat:      Pharynx: Posterior oropharyngeal erythema present. No oropharyngeal exudate.   Eyes:      Extraocular Movements: Extraocular movements intact.      Conjunctiva/sclera: Conjunctivae normal.   Pulmonary:      Effort: Pulmonary effort is normal.      Breath sounds: Wheezing and rhonchi present.   Musculoskeletal:         General: Normal range of motion.      Cervical back: Normal range of motion and neck supple.   Skin:     Coloration: Skin is not jaundiced.   Neurological:      General: No focal deficit present.      Mental  Status: He is alert and oriented to person, place, and time. Mental status is at baseline.   Psychiatric:         Mood and Affect: Mood normal.         Behavior: Behavior normal.        Reviewed:    Patient Active Problem List    Diagnosis    Other osteoporosis without current pathological fracture    Gastroesophageal reflux disease without esophagitis    Anal sphincter incompetence    Pressure injury of right buttock, stage 1    Osteopenia determined by x-ray    Recurrent UTI    Erectile disorder due to medical condition in male    Migraine without aura and without status migrainosus, not intractable    Current smoker    Paraplegia at T4 level ([facility])    History of spinal fusion    Insomnia    Anxiety    Neurogenic bladder    Neuropathic pain      No Known Allergies     Social History     Socioeconomic History    Marital status: Single   Tobacco Use    Smoking status: Every Day     Current packs/day: 1.00     Types: Cigarettes     Passive exposure: Current    Smokeless tobacco: Never    Tobacco comments:     10 cigs   Vaping Use    Vaping status: Never Used   Substance and Sexual Activity    Alcohol use: No    Drug use: Yes     Types: Cannabis     Comment: Medical marijuana evryday   Social History Narrative    The patient uses the following assistive device(s):  wheelchair.      The patient does live in a home with stairs.      Review of Systems   Respiratory:  Positive for cough.      All other systems negative unless otherwise stated in ROS or HPI above.       Murtaza Lin MD  Internal Medicine       Call office with any questions or seek emergency care if necessary.   Patient understands and agrees to follow directions and advice.      ----------------------------------------- PATIENT INSTRUCTIONS-----------------------------------------   .    Patient Instructions   1.  I am not entirely sure why this is occurring however we will get a chest x-ray done to evaluate for any pneumonia or other pathology within the  lungs.  2.  If you cough up the mucus or brown stuff again please take a photo and send it to me for review.  This will help me determine if this is truly blood, or if this is coming from a different part of the body.  3.  I will send Augmentin which is a penicillin-based antibiotic but you do not need to start this until I see the chest x-ray.  4.  Because you are wheezing and having this cough at night this may be a form of asthma, therefore I am going to give you prednisone 20 mg twice daily for 7 days which will stop the inflammation and the cough and I am also going to give you Wixela inhaler i.e. fluticasone salmeterol and I would like for you to do 2 puffs twice a day for 5 days then 1 puff twice daily thereafter as directed.  This is subject to change depending on the x-ray but if your x-ray shows a pneumonia we are simply adding antibiotics not removing treatment.

## 2024-07-18 NOTE — PATIENT INSTRUCTIONS
1.  I am not entirely sure why this is occurring however we will get a chest x-ray done to evaluate for any pneumonia or other pathology within the lungs.  2.  If you cough up the mucus or brown stuff again please take a photo and send it to me for review.  This will help me determine if this is truly blood, or if this is coming from a different part of the body.  3.  I will send Augmentin which is a penicillin-based antibiotic but you do not need to start this until I see the chest x-ray.  4.  Because you are wheezing and having this cough at night this may be a form of asthma, therefore I am going to give you prednisone 20 mg twice daily for 7 days which will stop the inflammation and the cough and I am also going to give you Wixela inhaler i.e. fluticasone salmeterol and I would like for you to do 2 puffs twice a day for 5 days then 1 puff twice daily thereafter as directed.  This is subject to change depending on the x-ray but if your x-ray shows a pneumonia we are simply adding antibiotics not removing treatment.

## 2024-07-19 ENCOUNTER — TELEPHONE (OUTPATIENT)
Facility: LOCATION | Age: 39
End: 2024-07-19

## 2024-07-19 DIAGNOSIS — J45.998 POST VIRAL ASTHMA (HCC): ICD-10-CM

## 2024-07-19 LAB
TTG IGA SER-ACNC: 0.3 U/ML (ref ?–7)
TTG IGG SER-ACNC: <0.6 U/ML (ref ?–7)

## 2024-07-19 RX ORDER — FLUTICASONE PROPIONATE AND SALMETEROL 250; 50 UG/1; UG/1
1 POWDER RESPIRATORY (INHALATION) EVERY 12 HOURS SCHEDULED
Qty: 3 EACH | Refills: 9 | Status: CANCELLED | OUTPATIENT
Start: 2024-07-19

## 2024-07-19 NOTE — TELEPHONE ENCOUNTER
Patient called, verified Name and . States he picked up his medications but was told that he needs doctor approval of fluticasone-salmeterol.    Routed to Triage Support for assistance.

## 2024-07-21 LAB
ACTH: 3.5 PG/ML
IGF I: 102 NG/ML
IGF-1, Z SCORE: -1.6 S.D.

## 2024-07-23 LAB — ALKALINE PHOSPHATASE BONE SPECIFIC: 28.8 UG/L

## 2024-07-24 LAB
SEX HORM BIND GLOB: 18.1 NMOL/L
TESTOST % FREE+WEAK BND: 32.4 %
TESTOST FREE+WEAK BND: 24.4 NG/DL
TESTOSTERONE TOT /MS: 75.4 NG/DL

## 2024-08-01 ENCOUNTER — OFFICE VISIT (OUTPATIENT)
Dept: SURGERY | Facility: CLINIC | Age: 39
End: 2024-08-01

## 2024-08-01 VITALS
HEIGHT: 71 IN | BODY MASS INDEX: 23.1 KG/M2 | SYSTOLIC BLOOD PRESSURE: 132 MMHG | DIASTOLIC BLOOD PRESSURE: 78 MMHG | WEIGHT: 165 LBS

## 2024-08-01 DIAGNOSIS — N31.9 NEUROGENIC BLADDER: Primary | ICD-10-CM

## 2024-08-01 DIAGNOSIS — N39.0 RECURRENT UTI: ICD-10-CM

## 2024-08-01 PROCEDURE — 99204 OFFICE O/P NEW MOD 45 MIN: CPT | Performed by: UROLOGY

## 2024-08-01 RX ORDER — SOLIFENACIN SUCCINATE 10 MG/1
10 TABLET, FILM COATED ORAL DAILY
Qty: 90 TABLET | Refills: 3 | Status: SHIPPED | OUTPATIENT
Start: 2024-08-01
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
## 2024-08-06 ENCOUNTER — LAB ENCOUNTER (OUTPATIENT)
Dept: LAB | Age: 39
End: 2024-08-06
Attending: INTERNAL MEDICINE
Payer: MEDICAID

## 2024-08-06 ENCOUNTER — NURSE TRIAGE (OUTPATIENT)
Facility: LOCATION | Age: 39
End: 2024-08-06

## 2024-08-06 NOTE — TELEPHONE ENCOUNTER
Action Requested: Summary for Provider     []  Critical Lab, Recommendations Needed  [] Need Additional Advice  [x]   FYI    []   Need Orders  [] Need Medications Sent to Pharmacy  []  Other     SUMMARY: Patient stated that for the last 2 days has noticed urinary frequency, particles in urine and odor to urine. Patient is paraplegic and self catheterizes. No blood in the urine. No fevers. Has reoccurring urinary tract infections.  No other symptoms. Patient wanted to make sure there was an order in the system to check his urine. Currently has an active standing order for a urinalysis and culture dated 6/6/2024. Advised patient that can go to the lab to get his urine checked and in the meantime to make sure drinking plenty of water. If symptoms worse to give us a call back. Patient agreed.   Reason for call: Urinary Symptoms (Urinary frequency, particles in urine, odor to urine)  Onset: Aug 4, 2024    Reason for Disposition   Bad or foul-smelling urine    Protocols used: Urinary Symptoms-A-OH

## 2024-08-07 ENCOUNTER — TELEPHONE (OUTPATIENT)
Facility: LOCATION | Age: 39
End: 2024-08-07

## 2024-08-07 ENCOUNTER — TELEPHONE (OUTPATIENT)
Dept: INTERNAL MEDICINE CLINIC | Facility: CLINIC | Age: 39
End: 2024-08-07

## 2024-08-07 NOTE — TELEPHONE ENCOUNTER
Message noted. A nurse spoke to patient earlier today.     Turner, called back. He needs ph# to schedule CT scan of chest. Transferred to central scheduling. .

## 2024-08-07 NOTE — TELEPHONE ENCOUNTER
You are receiving this encounter because we received information you received this page while on call and we did not find a note in the chart about the advice/directives you provided to the patient. Please indicate your actions in this encounter.      Paging    Message # [phone]         2024 04:42p   [ARLENE]  To:  From:  PRINCE Chávez MD:  Phone#:  ----------------------------------------------------------------------  PT; CEDRICK FAUST, [phone] , ; 87, RE; UTI INFECTION    Paged at number :  PAGE: [phone] at : Aug- 16:42            (Message Delivered)   D E L I V E R I E S :  2024 04:42p           ARLENE Frank

## 2024-08-07 NOTE — TELEPHONE ENCOUNTER
Call from Turner, patient's brother, on [facility], verified name/ of patient.  Patient self caths, had symptoms of particles in urine, feeling nauseous.  Urinalysis suggesting UTI, culture is in proceses.  Turner is asking Dr Lin to prescribed antibiotic, please advise?    He met with Dr Brown, but wants to see an alternate urologist, please advise.

## 2024-08-21 ENCOUNTER — HOSPITAL ENCOUNTER (OUTPATIENT)
Dept: CT IMAGING | Facility: HOSPITAL | Age: 39
Discharge: HOME | End: 2024-08-21
Attending: INTERNAL MEDICINE
Payer: MEDICAID

## 2024-08-21 DIAGNOSIS — R91.1 LUNG NODULE: ICD-10-CM

## 2024-08-21 LAB
CREAT BLD-MCNC: 0.4 MG/DL
EGFRCR SERPLBLD CKD-EPI 2021: 144 ML/MIN/1.73M2 (ref 60–?)

## 2024-08-21 PROCEDURE — 82565 ASSAY OF CREATININE: CPT

## 2024-08-21 PROCEDURE — 71260 CT THORAX DX C+: CPT | Performed by: INTERNAL MEDICINE

## 2024-09-22 DIAGNOSIS — G83.9 SPASTIC PARALYSIS (HCC): ICD-10-CM

## 2024-09-23 DIAGNOSIS — G83.9 SPASTIC PARALYSIS (HCC): ICD-10-CM

## 2024-09-23 NOTE — TELEPHONE ENCOUNTER
Patient called requesting refill on medication below, patient completely out     [facility][id number] - [place], [place] - [address] AT [facility] [address] [place] [address]     Medication Detail    Medication Quantity Refills Start End   baclofen 5 MG Oral Tab () 90 tablet 1 2024   Sig:   Take 1-2 tablets (5-10 mg total) by mouth 3 (three) times daily as needed (for muscle spasm).     Route:   Oral     PRN Comment:   for muscle spasm     Order #:   [id number]

## 2024-09-27 RX ORDER — BACLOFEN 5 MG/1
TABLET ORAL 3 TIMES DAILY PRN
Qty: 90 TABLET | Refills: 1 | OUTPATIENT
Start: 2024-09-27 | End: 2024-10-27

## 2024-09-27 RX ORDER — BACLOFEN 5 MG/1
TABLET ORAL 3 TIMES DAILY PRN
Qty: 270 TABLET | Refills: 3 | Status: SHIPPED | OUTPATIENT
Start: 2024-09-27

## 2024-10-04 ENCOUNTER — TELEPHONE (OUTPATIENT)
Dept: INTERNAL MEDICINE CLINIC | Facility: CLINIC | Age: 39
End: 2024-10-04

## 2024-10-04 NOTE — TELEPHONE ENCOUNTER
Patient needs a refill on:           diazePAM 5 MG/5ML Oral Solution 300 mL 2 7/18/2024 8/17/2024   Sig:   Take 10 mg by mouth every 8 (eight) hours. FOR SPASMS. STOP TABLETS OF DIAZEPAM. Please fill early, should have been converted to liquid not given pills last fill. MD approved override- felipe         [id number] - [place] [place]  AT [facility] [address] [place] [address], [phone], [phone]        Patient is out of the medication.

## 2024-10-04 NOTE — TELEPHONE ENCOUNTER
Spoke with [facility], they will call patient / brother.  They have refills on file for oral solution and tablets.  Solution can be ordered - will not be in stock until Monday.  Script filled at [facility]. Refills remain.    Outpatient Medication Detail     Disp Refills Start End    diazePAM 5 MG/5ML Oral Solution 300 mL 2 7/18/2024 8/17/2024    Sig - Route: Take 10 mg by mouth every 8 (eight) hours. FOR SPASMS. STOP TABLETS OF DIAZEPAM. Please fill early, should have been converted to liquid not given pills last fill. MD approved override- drkp - Oral    Class: Print Script            Outpatient Medication Detail   Disp Refills Start End   diazePAM 5 MG/5ML Oral Solution (Discontinued) 300 mL 2 7/2/2024 7/18/2024   Sig - Route: Take 10 mg by mouth every 8 (eight) hours. FOR SPASMS. STOP TABLETS OF DIAZEPAM. - Oral   Sent to pharmacy as: diazePAM 5 MG/5ML Oral Solution   E-Prescribing Status: Receipt confirmed by pharmacy (7/2/2024 11:55 AM CDT)   Pharmacy  [facility] #[id number] - [place], [place] - [address] AT [facility] [place] [address], [phone], [phone]

## 2024-10-22 ENCOUNTER — LAB ENCOUNTER (OUTPATIENT)
Dept: LAB | Age: 39
End: 2024-10-22
Attending: INTERNAL MEDICINE
Payer: MEDICAID

## 2024-10-22 ENCOUNTER — OFFICE VISIT (OUTPATIENT)
Facility: LOCATION | Age: 39
End: 2024-10-22

## 2024-10-22 VITALS
SYSTOLIC BLOOD PRESSURE: 125 MMHG | HEIGHT: 71 IN | OXYGEN SATURATION: 98 % | BODY MASS INDEX: 23.8 KG/M2 | HEART RATE: 78 BPM | DIASTOLIC BLOOD PRESSURE: 78 MMHG | WEIGHT: 170 LBS

## 2024-10-22 DIAGNOSIS — R25.1 EPISODE OF SHAKING: ICD-10-CM

## 2024-10-22 DIAGNOSIS — R45.89 ANXIETY ABOUT HEALTH: ICD-10-CM

## 2024-10-22 DIAGNOSIS — R25.2 SPASTICITY: ICD-10-CM

## 2024-10-22 DIAGNOSIS — N31.9 NEUROGENIC BLADDER: ICD-10-CM

## 2024-10-22 DIAGNOSIS — Z00.00 ANNUAL PHYSICAL EXAM: Primary | ICD-10-CM

## 2024-10-22 DIAGNOSIS — Z13.21 SCREENING FOR ENDOCRINE, NUTRITIONAL, METABOLIC AND IMMUNITY DISORDER: ICD-10-CM

## 2024-10-22 DIAGNOSIS — R05.8 PRODUCTIVE COUGH: ICD-10-CM

## 2024-10-22 DIAGNOSIS — J45.998 POST VIRAL ASTHMA (HCC): ICD-10-CM

## 2024-10-22 DIAGNOSIS — Z13.29 SCREENING FOR ENDOCRINE, NUTRITIONAL, METABOLIC AND IMMUNITY DISORDER: ICD-10-CM

## 2024-10-22 DIAGNOSIS — K21.9 GASTROESOPHAGEAL REFLUX DISEASE WITHOUT ESOPHAGITIS: ICD-10-CM

## 2024-10-22 DIAGNOSIS — G83.9 SPASTIC PARALYSIS (HCC): ICD-10-CM

## 2024-10-22 DIAGNOSIS — Z13.0 SCREENING FOR ENDOCRINE, NUTRITIONAL, METABOLIC AND IMMUNITY DISORDER: ICD-10-CM

## 2024-10-22 DIAGNOSIS — N52.1 ERECTILE DISORDER DUE TO MEDICAL CONDITION IN MALE: ICD-10-CM

## 2024-10-22 DIAGNOSIS — Z13.228 SCREENING FOR ENDOCRINE, NUTRITIONAL, METABOLIC AND IMMUNITY DISORDER: ICD-10-CM

## 2024-10-22 DIAGNOSIS — G82.20 PARAPLEGIA AT T4 LEVEL (HCC): ICD-10-CM

## 2024-10-22 DIAGNOSIS — F41.9 ANXIETY: ICD-10-CM

## 2024-10-22 DIAGNOSIS — E55.9 VITAMIN D DEFICIENCY: ICD-10-CM

## 2024-10-22 LAB
ALBUMIN SERPL-MCNC: 4.8 G/DL (ref 3.2–4.8)
ALBUMIN/GLOB SERPL: 1.9 {RATIO} (ref 1–2)
ALP LIVER SERPL-CCNC: 124 U/L
ALT SERPL-CCNC: 174 U/L
ANION GAP SERPL CALC-SCNC: 8 MMOL/L (ref 0–18)
AST SERPL-CCNC: 53 U/L (ref ?–34)
BILIRUB SERPL-MCNC: 0.4 MG/DL (ref 0.3–1.2)
BUN BLD-MCNC: 14 MG/DL (ref 9–23)
BUN/CREAT SERPL: 27.5 (ref 10–20)
CALCIUM BLD-MCNC: 9.7 MG/DL (ref 8.7–10.4)
CHLORIDE SERPL-SCNC: 108 MMOL/L (ref 98–112)
CHOLEST SERPL-MCNC: 280 MG/DL (ref ?–200)
CO2 SERPL-SCNC: 26 MMOL/L (ref 21–32)
CREAT BLD-MCNC: 0.51 MG/DL
DEPRECATED RDW RBC AUTO: 44.7 FL (ref 35.1–46.3)
EGFRCR SERPLBLD CKD-EPI 2021: 134 ML/MIN/1.73M2 (ref 60–?)
ERYTHROCYTE [DISTWIDTH] IN BLOOD BY AUTOMATED COUNT: 13.2 % (ref 11–15)
FASTING PATIENT LIPID ANSWER: NO
FASTING STATUS PATIENT QL REPORTED: NO
GLOBULIN PLAS-MCNC: 2.5 G/DL (ref 2–3.5)
GLUCOSE BLD-MCNC: 101 MG/DL (ref 70–99)
HCT VFR BLD AUTO: 44.5 %
HDLC SERPL-MCNC: 28 MG/DL (ref 40–59)
HGB BLD-MCNC: 14.5 G/DL
LDLC SERPL CALC-MCNC: 170 MG/DL (ref ?–100)
MCH RBC QN AUTO: 30 PG (ref 26–34)
MCHC RBC AUTO-ENTMCNC: 32.6 G/DL (ref 31–37)
MCV RBC AUTO: 92.1 FL
NONHDLC SERPL-MCNC: 252 MG/DL (ref ?–130)
OSMOLALITY SERPL CALC.SUM OF ELEC: 295 MOSM/KG (ref 275–295)
PLATELET # BLD AUTO: 221 10(3)UL (ref 150–450)
POTASSIUM SERPL-SCNC: 4.3 MMOL/L (ref 3.5–5.1)
PROT SERPL-MCNC: 7.3 G/DL (ref 5.7–8.2)
RBC # BLD AUTO: 4.83 X10(6)UL
SODIUM SERPL-SCNC: 142 MMOL/L (ref 136–145)
TRIGL SERPL-MCNC: 418 MG/DL (ref 30–149)
TSI SER-ACNC: 0.84 MIU/ML (ref 0.55–4.78)
VIT D+METAB SERPL-MCNC: 18.8 NG/ML (ref 30–100)
VLDLC SERPL CALC-MCNC: 87 MG/DL (ref 0–30)
WBC # BLD AUTO: 8.5 X10(3) UL (ref 4–11)

## 2024-10-22 PROCEDURE — 85027 COMPLETE CBC AUTOMATED: CPT | Performed by: INTERNAL MEDICINE

## 2024-10-22 PROCEDURE — 84443 ASSAY THYROID STIM HORMONE: CPT | Performed by: INTERNAL MEDICINE

## 2024-10-22 PROCEDURE — 80061 LIPID PANEL: CPT | Performed by: INTERNAL MEDICINE

## 2024-10-22 PROCEDURE — 82306 VITAMIN D 25 HYDROXY: CPT | Performed by: INTERNAL MEDICINE

## 2024-10-22 PROCEDURE — 99395 PREV VISIT EST AGE 18-39: CPT | Performed by: INTERNAL MEDICINE

## 2024-10-22 PROCEDURE — 80053 COMPREHEN METABOLIC PANEL: CPT | Performed by: INTERNAL MEDICINE

## 2024-10-22 PROCEDURE — 83036 HEMOGLOBIN GLYCOSYLATED A1C: CPT | Performed by: INTERNAL MEDICINE

## 2024-10-22 PROCEDURE — 36415 COLL VENOUS BLD VENIPUNCTURE: CPT | Performed by: INTERNAL MEDICINE

## 2024-10-22 RX ORDER — PANTOPRAZOLE SODIUM 40 MG/1
40 TABLET, DELAYED RELEASE ORAL
Qty: 90 TABLET | Refills: 6 | Status: SHIPPED | OUTPATIENT
Start: 2024-10-22

## 2024-10-22 RX ORDER — BACLOFEN 5 MG/1
TABLET ORAL 3 TIMES DAILY PRN
Qty: 270 TABLET | Refills: 6 | Status: SHIPPED | OUTPATIENT
Start: 2024-10-22

## 2024-10-22 RX ORDER — DIAZEPAM 10 MG/1
10 TABLET ORAL EVERY 8 HOURS PRN
Qty: 90 TABLET | Refills: 3 | Status: SHIPPED | OUTPATIENT
Start: 2024-10-22

## 2024-10-22 RX ORDER — MIRTAZAPINE 30 MG/1
30 TABLET, FILM COATED ORAL NIGHTLY
Qty: 90 TABLET | Refills: 6 | Status: SHIPPED | OUTPATIENT
Start: 2024-10-22

## 2024-10-22 RX ORDER — SOLIFENACIN SUCCINATE 10 MG/1
10 TABLET, FILM COATED ORAL DAILY
Qty: 90 TABLET | Refills: 6 | Status: SHIPPED | OUTPATIENT
Start: 2024-10-22

## 2024-10-22 RX ORDER — PRAZOSIN HYDROCHLORIDE 1 MG/1
1 CAPSULE ORAL NIGHTLY
Qty: 90 CAPSULE | Refills: 6 | Status: SHIPPED | OUTPATIENT
Start: 2024-10-22

## 2024-10-22 RX ORDER — FLUTICASONE PROPIONATE AND SALMETEROL 250; 50 UG/1; UG/1
1 POWDER RESPIRATORY (INHALATION) EVERY 12 HOURS SCHEDULED
Qty: 3 EACH | Refills: 9 | Status: SHIPPED | OUTPATIENT
Start: 2024-10-22

## 2024-10-22 RX ORDER — ESCITALOPRAM OXALATE 10 MG/1
10 TABLET ORAL DAILY
Qty: 90 TABLET | Refills: 6 | Status: SHIPPED | OUTPATIENT
Start: 2024-10-22

## 2024-10-22 NOTE — PROGRESS NOTES
INTERNAL MEDICINE ANNUAL EXAM NOTE     Patient ID: Keyur Duckworth is a 38 year old male.  Chief Complaint: UTI (Not having issues anymore)      Keyur Duckworth is a pleasant 38 year old male who presents for annual physical exam. Keyur Duckworth is doing well today.  Still having lots of shaking, worse in cold weather, not having much improvement with diazepam.     Health Maintenance  - All care gaps addressed with patient.     Review of Systems  Review of Systems   Constitutional:  Negative for unexpected weight change.   HENT:  Negative for hearing loss.    Eyes:  Negative for pain and visual disturbance.   Respiratory:  Negative for shortness of breath.    Cardiovascular:  Negative for chest pain, palpitations and leg swelling.   Gastrointestinal:  Negative for abdominal pain and blood in stool.   Genitourinary:  Negative for difficulty urinating and hematuria.   Neurological:  Positive for tremors. Negative for syncope.   Psychiatric/Behavioral: Negative.         Physical Exam  Vitals:    10/22/24 1347   BP: 125/78   Pulse: 78   SpO2: 98%   Weight: 170 lb (77.1 kg)   Height: 5' 11\" (1.803 m)     Body mass index is 23.71 kg/m².  BP Readings from Last 3 Encounters:   10/22/24 125/78   08/01/24 132/78   07/18/24 133/83     Physical Exam  Vitals and nursing note reviewed.   Constitutional:       General: He is not in acute distress.     Appearance: Normal appearance.   HENT:      Head: Normocephalic.      Right Ear: External ear normal.      Left Ear: External ear normal.   Eyes:      Extraocular Movements: Extraocular movements intact.      Conjunctiva/sclera: Conjunctivae normal.   Cardiovascular:      Rate and Rhythm: Normal rate and regular rhythm.      Pulses: Normal pulses.      Heart sounds: Normal heart sounds.   Pulmonary:      Effort: Pulmonary effort is normal. No respiratory distress.      Breath sounds: Normal breath sounds. No wheezing.   Abdominal:      General:  Abdomen is flat. Bowel sounds are normal.      Tenderness: There is no abdominal tenderness.   Musculoskeletal:      Cervical back: Normal range of motion and neck supple.   Skin:     Coloration: Skin is not jaundiced.   Neurological:      General: No focal deficit present.      Mental Status: He is alert and oriented to person, place, and time. Mental status is at baseline.   Psychiatric:         Mood and Affect: Mood normal.         Behavior: Behavior normal.           Labs & Imaging  Pertinent labs and imaging reviewed.   Lab Results   Component Value Date     (H) 07/18/2024    BUN 10 07/18/2024    BUNCREA 22.7 (H) 07/18/2024    CREATSERUM 0.44 (L) 07/18/2024    ANIONGAP 7 07/18/2024    GFRNAA 137 05/12/2022    GFRAA 158 05/12/2022    CA 9.6 07/18/2024    OSMOCALC 293 07/18/2024    ALKPHO 183 (H) 07/18/2024    AST 39 (H) 07/18/2024    ALT 95 (H) 07/18/2024    BILT 0.3 07/18/2024    TP 7.8 07/18/2024    ALB 4.9 (H) 07/18/2024    GLOBULIN 2.9 07/18/2024     07/18/2024    K 3.9 07/18/2024     07/18/2024    CO2 24.0 07/18/2024     Lab Results   Component Value Date     08/02/2023    A1C 5.9 (H) 08/02/2023     Lab Results   Component Value Date    WBC 13.0 (H) 07/18/2024    RBC 4.11 (L) 07/18/2024    HGB 12.2 (L) 07/18/2024    HCT 38.0 (L) 07/18/2024    MCV 92.5 07/18/2024    MCH 29.7 07/18/2024    MCHC 32.1 07/18/2024    RDW 13.0 07/18/2024    .0 07/18/2024     Lab Results   Component Value Date    CHOLEST 214 (H) 08/17/2023    TRIG 482 (H) 08/17/2023    HDL 23 (L) 08/17/2023     (H) 08/17/2023    VLDL 85 (H) 08/17/2023    NONHDLC 191 (H) 08/17/2023     The ASCVD Risk score (Nia DK, et al., 2019) failed to calculate for the following reasons:    The 2019 ASCVD risk score is only valid for ages 40 to 79    Medical History    Reviewed allergies:  Allergies[1]     Reviewed:  Patient Active Problem List    Diagnosis    Other osteoporosis without current pathological fracture     Gastroesophageal reflux disease without esophagitis    Anal sphincter incompetence    Pressure injury of right buttock, stage 1    Osteopenia determined by x-ray    Recurrent UTI    Erectile disorder due to medical condition in male    Migraine without aura and without status migrainosus, not intractable    Current smoker    Paraplegia at T4 level (HCC)    History of spinal fusion    Insomnia    Anxiety    Neurogenic bladder    Neuropathic pain      Reviewed:  Past Medical History:    History of spinal fusion    Paraplegia (HCC)      Reviewed:  Family History   Problem Relation Age of Onset    Cancer Maternal Grandmother         Breast Cancer.     Cancer Maternal Grandfather         Lung Cancer- non smoker.        Reviewed:  Past Surgical History:   Procedure Laterality Date    Other surgical history      T12 Spinal surgery Has stabilizor and screws placed.    Other surgical history      surgery for chest nerves cut and burnt off       Reviewed:  Social History     Socioeconomic History    Marital status: Single   Tobacco Use    Smoking status: Every Day     Current packs/day: 1.00     Types: Cigarettes     Passive exposure: Current    Smokeless tobacco: Never    Tobacco comments:     10 cigs   Vaping Use    Vaping status: Never Used   Substance and Sexual Activity    Alcohol use: No    Drug use: Yes     Types: Cannabis     Comment: Medical marijuana evryday   Social History Narrative    The patient uses the following assistive device(s):  wheelchair.      The patient does live in a home with stairs.      Reviewed:  Current Outpatient Medications   Medication Sig Dispense Refill    prazosin 1 MG Oral Cap Take 1 capsule (1 mg total) by mouth nightly. 90 capsule 6    Tri-Mix Double Strength (PPP) Injection Solution Inject 1 mL as directed as needed.     Inject intracavernosally as directed prior to intercourse     Tri-Mix Double Strength Recipe:  - Prostaglandin E1 11.8 ug/ml  - Papaverine HCI 18mg/ ml  - Phentolamine  Mesylate 0.6 mg/ ml 8 mL 1    diazePAM 10 MG Oral Tab Take 1 tablet (10 mg total) by mouth every 8 (eight) hours as needed (muscle spasticity). Dose increase MD approved. 90 tablet 3    escitalopram 10 MG Oral Tab Take 1 tablet (10 mg total) by mouth daily. FOR ANXIETY. 90 tablet 6    mirtazapine 30 MG Oral Tab Take 1 tablet (30 mg total) by mouth nightly. 90 tablet 6    pantoprazole 40 MG Oral Tab EC Take 1 tablet (40 mg total) by mouth every morning before breakfast. 90 tablet 6    Solifenacin Succinate 10 MG Oral Tab Take 1 tablet (10 mg total) by mouth daily. 90 tablet 6    baclofen 5 MG Oral Tab Take 1-2 tablets (5-10 mg total) by mouth 3 (three) times daily as needed (for muscle spasm). 270 tablet 6    fluticasone-salmeterol (WIXELA INHUB) 250-50 MCG/ACT Inhalation Aerosol Powder, Breath Activated Inhale 1 puff into the lungs every 12 (twelve) hours. FOR ASTHMA. 3 each 9    fexofenadine (ALLERGY RELIEF) 180 MG Oral Tab Take 1 tablet (180 mg total) by mouth nightly. 90 tablet 3    Azelastine HCl 137 MCG/SPRAY Nasal Solution 1 spray by Nasal route 2 (two) times a day. FOR SINUS SYMPTOMS/NASAL CONGESTION. 30 mL 3    OxyCODONE HCl IR 15 MG Oral Tab Take 1 tablet (15 mg total) by mouth every 8 (eight) hours as needed.      OxyCODONE HCl IR 30 MG Oral Tab Take 1 tablet (30 mg total) by mouth every 8 (eight) hours as needed for Pain.            Assessment & Plan    1. Annual physical exam  - Comp Metabolic Panel (14)  - Hemoglobin A1C  - CBC, Platelet; No Differential  - Lipid Panel  - TSH W Reflex To Free T4    2. Neurogenic bladder  - prazosin 1 MG Oral Cap; Take 1 capsule (1 mg total) by mouth nightly.  Dispense: 90 capsule; Refill: 6  - Solifenacin Succinate 10 MG Oral Tab; Take 1 tablet (10 mg total) by mouth daily.  Dispense: 90 tablet; Refill: 6  - Urinalysis, Routine [E]; Future  - Urine Culture, Routine [E]; Future    3. Erectile disorder due to medical condition in male  - Tri-Mix Double Strength (PPP)  Injection Solution; Inject 1 mL as directed as needed.     Inject intracavernosally as directed prior to intercourse     Tri-Mix Double Strength Recipe:  - Prostaglandin E1 11.8 ug/ml  - Papaverine HCI 18mg/ ml  - Phentolamine Mesylate 0.6 mg/ ml  Dispense: 8 mL; Refill: 1    4. Paraplegia at T4 level (HCC)  - diazePAM 10 MG Oral Tab; Take 1 tablet (10 mg total) by mouth every 8 (eight) hours as needed (muscle spasticity). Dose increase MD approved.  Dispense: 90 tablet; Refill: 3  - Neuro Referral - In Network  - Psychiatry Referral - In Network    5. Spasticity  - diazePAM 10 MG Oral Tab; Take 1 tablet (10 mg total) by mouth every 8 (eight) hours as needed (muscle spasticity). Dose increase MD approved.  Dispense: 90 tablet; Refill: 3  - Neuro Referral - In Network    6. Episode of shaking  - Neuro Referral - In Network    7. Anxiety  - escitalopram 10 MG Oral Tab; Take 1 tablet (10 mg total) by mouth daily. FOR ANXIETY.  Dispense: 90 tablet; Refill: 6  - mirtazapine 30 MG Oral Tab; Take 1 tablet (30 mg total) by mouth nightly.  Dispense: 90 tablet; Refill: 6    8. Gastroesophageal reflux disease without esophagitis  - pantoprazole 40 MG Oral Tab EC; Take 1 tablet (40 mg total) by mouth every morning before breakfast.  Dispense: 90 tablet; Refill: 6    9. Spastic paralysis (HCC)  - baclofen 5 MG Oral Tab; Take 1-2 tablets (5-10 mg total) by mouth 3 (three) times daily as needed (for muscle spasm).  Dispense: 270 tablet; Refill: 6  - Psychiatry Referral - In Network    10. Anxiety about health  - Psychiatry Referral - In Network    11. Productive cough  - XR CHEST PA + LAT CHEST (CPT=71046); Future    12. Post viral asthma (HCC)  - fluticasone-salmeterol (WIXELA INHUB) 250-50 MCG/ACT Inhalation Aerosol Powder, Breath Activated; Inhale 1 puff into the lungs every 12 (twelve) hours. FOR ASTHMA.  Dispense: 3 each; Refill: 9    13. Screening for endocrine, nutritional, metabolic and immunity disorder  - Comp Metabolic  Panel (14)  - Hemoglobin A1C  - CBC, Platelet; No Differential  - Lipid Panel  - TSH W Reflex To Free T4  - Vitamin D    14. Vitamin D deficiency  - Vitamin D  Plan  Overall doing well today. Screening labs, preventive imaging/procedure, and health care gaps addressed as per USPSTF guidelines, orders available electronically via [facility] and in [facility].  Vaccines discussed and administered depending on availability and per patient preference; patient to return for any outstanding vaccines once available.  Patient brought to  to help schedule care gaps and follow up. Further recommendations depending on lab results.            Follow Up:   Return for 1 YEAR FOR ANNUAL OR DEPENDING ON LAB RESULTS.      Murtaza Lin MD  Internal Medicine      Patient asked to sign release of information for outside records if not already requested, make future office/imaging appointments at the  prior to leaving, and to sign up for [facility] if not already active.  Preventive measures and further education discussed with patient as per after visit summary. Potential medication side effects discussed. All questions answered to best of ability.   Call office with any questions. Seek emergency care if necessary.   Patient understands and agrees to follow directions and advice.      ----------------------------------------- PATIENT INSTRUCTIONS-----------------------------------------     There are no Patient Instructions on file for this visit.           [1] No Known Allergies

## 2024-10-23 ENCOUNTER — LAB ENCOUNTER (OUTPATIENT)
Dept: LAB | Age: 39
End: 2024-10-23
Attending: INTERNAL MEDICINE
Payer: MEDICAID

## 2024-10-23 LAB
BILIRUB UR QL: NEGATIVE
CLARITY UR: CLEAR
COLOR UR: YELLOW
EST. AVERAGE GLUCOSE BLD GHB EST-MCNC: 111 MG/DL (ref 68–126)
GLUCOSE UR-MCNC: NORMAL MG/DL
HBA1C MFR BLD: 5.5 % (ref ?–5.7)
HGB UR QL STRIP.AUTO: NEGATIVE
KETONES UR-MCNC: NEGATIVE MG/DL
LEUKOCYTE ESTERASE UR QL STRIP.AUTO: 500
PH UR: 7 [PH] (ref 5–8)
PROT UR-MCNC: NEGATIVE MG/DL
SP GR UR STRIP: 1.02 (ref 1–1.03)
UROBILINOGEN UR STRIP-ACNC: NORMAL

## 2024-10-23 PROCEDURE — 87086 URINE CULTURE/COLONY COUNT: CPT

## 2024-10-23 PROCEDURE — 87077 CULTURE AEROBIC IDENTIFY: CPT

## 2024-10-23 PROCEDURE — 81001 URINALYSIS AUTO W/SCOPE: CPT

## 2024-10-23 PROCEDURE — 87186 SC STD MICRODIL/AGAR DIL: CPT

## 2024-10-24 ENCOUNTER — TELEPHONE (OUTPATIENT)
Dept: INTERNAL MEDICINE CLINIC | Facility: CLINIC | Age: 39
End: 2024-10-24

## 2024-10-24 ENCOUNTER — TELEPHONE (OUTPATIENT)
Age: 39
End: 2024-10-24

## 2024-10-24 DIAGNOSIS — R05.8 PRODUCTIVE COUGH: Primary | ICD-10-CM

## 2024-10-24 RX ORDER — FLUTICASONE PROPIONATE AND SALMETEROL 250; 50 UG/1; UG/1
1 POWDER RESPIRATORY (INHALATION) 2 TIMES DAILY
Qty: 14 EACH | Refills: 0 | Status: SHIPPED | OUTPATIENT
Start: 2024-10-24

## 2024-10-24 NOTE — TELEPHONE ENCOUNTER
Prior Authorization      diazePAM 10 MG Oral Tab, Take 1 tablet (10 mg total) by mouth every 8 (eight) hours as needed (muscle spasticity). Dose increase MD approved., Disp: 90 tablet, Rfl: 3    [id number]

## 2024-10-24 NOTE — TELEPHONE ENCOUNTER
Prior Authorization      fluticasone-salmeterol (WIXELA INHUB) 250-50 MCG/ACT Inhalation Aerosol Powder, Breath Activated, Inhale 1 puff into the lungs every 12 (twelve) hours.     [id number]

## 2024-10-24 NOTE — TELEPHONE ENCOUNTER
Hello,  Sorry I missed you - I am reaching out from the [facility] Navigation department, following up on an order from your provider's office to assist in connecting you with resources for care. If you would like to discuss this further, please give us a call back at [phone], or for more immediate assistance you can contact our 24-hour help line at [phone]. We look forward to hearing from you soon.

## 2024-11-06 ENCOUNTER — TELEPHONE (OUTPATIENT)
Age: 39
End: 2024-11-06

## 2024-11-06 NOTE — TELEPHONE ENCOUNTER
Hello,  Sorry I missed you - I am reaching out from the [facility] Navigation department, following up on an order from your provider's office to assist in connecting you with resources for care. If you would like to discuss this further, please give us a call back at [phone], or for more immediate assistance you can contact our 24-hour help line at [phone]. We look forward to hearing from you soon.

## 2024-11-15 DIAGNOSIS — J01.10 ACUTE FRONTAL SINUSITIS, RECURRENCE NOT SPECIFIED: ICD-10-CM

## 2024-11-18 RX ORDER — FEXOFENADINE HCL 180 MG/1
180 TABLET ORAL NIGHTLY
Qty: 90 TABLET | Refills: 3 | Status: SHIPPED | OUTPATIENT
Start: 2024-11-18

## 2024-11-19 NOTE — TELEPHONE ENCOUNTER
Refill passed per [facility] protocols.    Requested Prescriptions   Pending Prescriptions Disp Refills    ALLERGY RELIEF 180 MG Oral Tab [Pharmacy Med Name: FEXOFENADINE 180MG TABLETS (OTC)] 90 tablet 3     Sig: TAKE 1 TABLET(180 MG) BY MOUTH EVERY NIGHT       Allergy Medication Protocol Passed - 11/18/2024  6:53 PM        Passed - In person appointment or virtual visit in the past 12 mos or appointment in next 3 mos     Recent Outpatient Visits              3 weeks ago Annual physical exam    [facility] Murtaza Lin MD    Office Visit    3 months ago Neurogenic bladder    [facility] Kumar Brown MD    Office Visit    4 months ago Hemoptysis    [facility] Murtaza Lin MD    Office Visit    4 months ago Cloudy urine    [facility] Murtaza Lin MD    Office Visit    7 months ago Other osteoporosis without current pathological fracture    [facility] Karley López MD    Office Visit

## 2024-11-22 ENCOUNTER — MED REC SCAN ONLY (OUTPATIENT)
Facility: LOCATION | Age: 39
End: 2024-11-22

## 2024-12-02 ENCOUNTER — LAB ENCOUNTER (OUTPATIENT)
Dept: LAB | Age: 39
End: 2024-12-02
Attending: INTERNAL MEDICINE
Payer: MEDICAID

## 2024-12-02 ENCOUNTER — NURSE TRIAGE (OUTPATIENT)
Facility: LOCATION | Age: 39
End: 2024-12-02

## 2024-12-02 DIAGNOSIS — N39.0 URINARY TRACT INFECTION WITHOUT HEMATURIA, SITE UNSPECIFIED: Primary | ICD-10-CM

## 2024-12-02 DIAGNOSIS — T83.511A URINARY TRACT INFECTION ASSOCIATED WITH CATHETERIZATION OF URINARY TRACT, UNSPECIFIED INDWELLING URINARY CATHETER TYPE, INITIAL ENCOUNTER (HCC): ICD-10-CM

## 2024-12-02 DIAGNOSIS — N39.0 URINARY TRACT INFECTION ASSOCIATED WITH CATHETERIZATION OF URINARY TRACT, UNSPECIFIED INDWELLING URINARY CATHETER TYPE, INITIAL ENCOUNTER (HCC): ICD-10-CM

## 2024-12-02 RX ORDER — SULFAMETHOXAZOLE AND TRIMETHOPRIM 800; 160 MG/1; MG/1
1 TABLET ORAL 2 TIMES DAILY
Qty: 14 TABLET | Refills: 0 | Status: SHIPPED | OUTPATIENT
Start: 2024-12-02 | End: 2024-12-09

## 2024-12-02 NOTE — TELEPHONE ENCOUNTER
Action Requested: Summary for Provider     []  Critical Lab, Recommendations Needed  [] Need Additional Advice  []   FYI    []   Need Orders  [] Need Medications Sent to Pharmacy  []  Other     SUMMARY: Patient' brother is requesting a urine test to rule out UTI and get antibiotics. Order pending.     Reason for call: Urinary Symptoms  Onset: Data Unavailable    Symptoms started before Thanksgiving. Patient's brother Turner states that patient is paralyzed from the chest down so he cannot feel any urinary pain. He checked patient's urine bag and can see particles and patient was vomiting around Thanksgiving. Turner checked his urine sample with a UTI test strip from the pharmacy and it is positive for UTI. Unsure if patient has fever. Patient has stopped vomiting.     Reason for Disposition   Vomiting    Protocols used: Urination Pain - Male-A-OH

## 2024-12-02 NOTE — TELEPHONE ENCOUNTER
Chronic problem.  Improved.  Continue metformin  mg BID and glipizide 5 mg daily.  Will request note from [facility] about diabetic eye exam.   Mychart sent. Get urine, culture, start bactrim pending results.

## 2025-01-06 DIAGNOSIS — G82.20 PARAPLEGIA AT T4 LEVEL (HCC): ICD-10-CM

## 2025-01-06 DIAGNOSIS — R25.2 SPASTICITY: ICD-10-CM

## 2025-01-06 RX ORDER — DIAZEPAM 10 MG/1
10 TABLET ORAL EVERY 8 HOURS PRN
Qty: 90 TABLET | Refills: 3 | Status: SHIPPED | OUTPATIENT
Start: 2025-01-06

## 2025-01-06 NOTE — TELEPHONE ENCOUNTER
Dr. Lin, please kindly review; protocol failed/no protocol attached.    No future appointments.  Last office visit: 10/22/2024    Recent fill dates: 12/7/24, 11/3/24, and 10/4/24  Date of  last written prescription: 10/22/24   Last written quantity: #90 each and processed as a 30 day supply  [x] Takes as needed  [] Takes scheduled daily    Requested Prescriptions     Pending Prescriptions Disp Refills    diazePAM 10 MG Oral Tab 90 tablet 3     Sig: Take 1 tablet (10 mg total) by mouth every 8 (eight) hours as needed (muscle spasticity). Dose increase MD approved.

## 2025-01-06 NOTE — TELEPHONE ENCOUNTER
Patient called to refill the following medications:     Medication Quantity Refills Start End   diazePAM 10 MG Oral Tab 90 tablet 3 10/22/2024 --   Sig:   Take 1 tablet (10 mg total) by mouth every 8 (eight) hours as needed (muscle spasticity). Dose increase MD approved.     Route:   Oral     PRN Comment:   muscle spasticity     Order #:   [id number]         Medication Quantity Refills Start End   baclofen 5 MG Oral Tab 270 tablet 6 10/22/2024 --   Sig:   Take 1-2 tablets (5-10 mg total) by mouth 3 (three) times daily as needed (for muscle spasm).     Route:   Oral     PRN Comment:   for muscle spasm     Order #:   [id number]          Pharmacy:   [id number] - [place] - [address] AT [facility] [address], [phone], [phone]

## 2025-01-17 DIAGNOSIS — J01.10 ACUTE FRONTAL SINUSITIS, RECURRENCE NOT SPECIFIED: ICD-10-CM

## 2025-01-21 RX ORDER — FEXOFENADINE HCL 180 MG/1
180 TABLET ORAL NIGHTLY
Qty: 90 TABLET | Refills: 3 | Status: SHIPPED | OUTPATIENT
Start: 2025-01-21

## 2025-02-04 ENCOUNTER — TELEPHONE (OUTPATIENT)
Dept: INTERNAL MEDICINE CLINIC | Facility: CLINIC | Age: 40
End: 2025-02-04

## 2025-02-04 NOTE — TELEPHONE ENCOUNTER
Patient calling to request refill, stated is running low, verified [facility] in [place].     Current Outpatient Medications   Medication Sig Dispense Refill    baclofen 5 MG Oral Tab Take 1-2 tablets (5-10 mg total) by mouth 3 (three) times daily as needed (for muscle spasm). 270 tablet 6

## 2025-02-04 NOTE — TELEPHONE ENCOUNTER
Patient called requesting a refill on the following medication:    diazePAM 10 MG Oral Tab     Per patient please send refill to the following pharmacy:    [facility]     [address] [place] [id number]

## 2025-02-15 ENCOUNTER — APPOINTMENT (OUTPATIENT)
Dept: GENERAL RADIOLOGY | Facility: HOSPITAL | Age: 40
End: 2025-02-15
Attending: EMERGENCY MEDICINE
Payer: MEDICAID

## 2025-02-15 ENCOUNTER — HOSPITAL ENCOUNTER (EMERGENCY)
Facility: HOSPITAL | Age: 40
Discharge: HOME | End: 2025-02-15
Attending: EMERGENCY MEDICINE
Payer: MEDICAID

## 2025-02-15 VITALS
DIASTOLIC BLOOD PRESSURE: 76 MMHG | RESPIRATION RATE: 18 BRPM | OXYGEN SATURATION: 94 % | HEART RATE: 88 BPM | TEMPERATURE: 98 F | SYSTOLIC BLOOD PRESSURE: 115 MMHG

## 2025-02-15 DIAGNOSIS — E87.6 HYPOKALEMIA: ICD-10-CM

## 2025-02-15 DIAGNOSIS — S82.141A CLOSED FRACTURE OF RIGHT TIBIAL PLATEAU, INITIAL ENCOUNTER: Primary | ICD-10-CM

## 2025-02-15 LAB
ALBUMIN SERPL-MCNC: 4.8 G/DL (ref 3.2–4.8)
ALBUMIN/GLOB SERPL: 1.9 {RATIO} (ref 1–2)
ALP LIVER SERPL-CCNC: 120 U/L
ALT SERPL-CCNC: 307 U/L
ANION GAP SERPL CALC-SCNC: 5 MMOL/L (ref 0–18)
AST SERPL-CCNC: 67 U/L (ref ?–34)
BASOPHILS # BLD AUTO: 0.02 X10(3) UL (ref 0–0.2)
BASOPHILS NFR BLD AUTO: 0.2 %
BILIRUB SERPL-MCNC: 0.7 MG/DL (ref 0.3–1.2)
BUN BLD-MCNC: 10 MG/DL (ref 9–23)
BUN/CREAT SERPL: 20.4 (ref 10–20)
CALCIUM BLD-MCNC: 9.3 MG/DL (ref 8.7–10.4)
CHLORIDE SERPL-SCNC: 107 MMOL/L (ref 98–112)
CO2 SERPL-SCNC: 26 MMOL/L (ref 21–32)
CREAT BLD-MCNC: 0.49 MG/DL
DEPRECATED RDW RBC AUTO: 43.8 FL (ref 35.1–46.3)
EGFRCR SERPLBLD CKD-EPI 2021: 135 ML/MIN/1.73M2 (ref 60–?)
EOSINOPHIL # BLD AUTO: 0.22 X10(3) UL (ref 0–0.7)
EOSINOPHIL NFR BLD AUTO: 2.6 %
ERYTHROCYTE [DISTWIDTH] IN BLOOD BY AUTOMATED COUNT: 13 % (ref 11–15)
GLOBULIN PLAS-MCNC: 2.5 G/DL (ref 2–3.5)
GLUCOSE BLD-MCNC: 113 MG/DL (ref 70–99)
HCT VFR BLD AUTO: 42.8 %
HGB BLD-MCNC: 14.5 G/DL
IMM GRANULOCYTES # BLD AUTO: 0.03 X10(3) UL (ref 0–1)
IMM GRANULOCYTES NFR BLD: 0.4 %
LYMPHOCYTES # BLD AUTO: 1.82 X10(3) UL (ref 1–4)
LYMPHOCYTES NFR BLD AUTO: 21.6 %
MCH RBC QN AUTO: 31.1 PG (ref 26–34)
MCHC RBC AUTO-ENTMCNC: 33.9 G/DL (ref 31–37)
MCV RBC AUTO: 91.8 FL
MONOCYTES # BLD AUTO: 0.55 X10(3) UL (ref 0.1–1)
MONOCYTES NFR BLD AUTO: 6.5 %
NEUTROPHILS # BLD AUTO: 5.77 X10 (3) UL (ref 1.5–7.7)
NEUTROPHILS # BLD AUTO: 5.77 X10(3) UL (ref 1.5–7.7)
NEUTROPHILS NFR BLD AUTO: 68.7 %
OSMOLALITY SERPL CALC.SUM OF ELEC: 286 MOSM/KG (ref 275–295)
PLATELET # BLD AUTO: 207 10(3)UL (ref 150–450)
POTASSIUM SERPL-SCNC: 3.2 MMOL/L (ref 3.5–5.1)
PROT SERPL-MCNC: 7.3 G/DL (ref 5.7–8.2)
RBC # BLD AUTO: 4.66 X10(6)UL
SODIUM SERPL-SCNC: 138 MMOL/L (ref 136–145)
WBC # BLD AUTO: 8.4 X10(3) UL (ref 4–11)

## 2025-02-15 PROCEDURE — 99284 EMERGENCY DEPT VISIT MOD MDM: CPT

## 2025-02-15 PROCEDURE — 85025 COMPLETE CBC W/AUTO DIFF WBC: CPT | Performed by: EMERGENCY MEDICINE

## 2025-02-15 PROCEDURE — 85025 COMPLETE CBC W/AUTO DIFF WBC: CPT

## 2025-02-15 PROCEDURE — 73562 X-RAY EXAM OF KNEE 3: CPT | Performed by: EMERGENCY MEDICINE

## 2025-02-15 PROCEDURE — 80053 COMPREHEN METABOLIC PANEL: CPT

## 2025-02-15 PROCEDURE — 80053 COMPREHEN METABOLIC PANEL: CPT | Performed by: EMERGENCY MEDICINE

## 2025-02-15 PROCEDURE — 36415 COLL VENOUS BLD VENIPUNCTURE: CPT

## 2025-02-15 RX ORDER — POTASSIUM CHLORIDE 1500 MG/1
40 TABLET, EXTENDED RELEASE ORAL ONCE
Status: COMPLETED | OUTPATIENT
Start: 2025-02-15 | End: 2025-02-15

## 2025-02-15 NOTE — ED INITIAL ASSESSMENT (HPI)
Pt to ED via EMS w/ c/o right knee concern after transferring from wheelchair to car, pt states his right leg got stuck under the pedal and twisted.   Pt states that he is paralyzed from the chest all the way down to BLE.   Family at bedside with patient.

## 2025-02-15 NOTE — ED PROVIDER NOTES
Patient Seen in: [facility] Emergency Department      History     Chief Complaint   Patient presents with    Knee Pain     Stated Complaint:     Subjective:   39-year-old male with history of paraplegia on pain medication states he was transferring from his wheelchair to a car and his leg got caught in the wheelchair foot rest.  He does not have sensation in his legs so he did not know anything had happened but later in the evening he noticed his right knee was swollen and when passively move his well.  He still had the swelling this morning so they called EMS and he was brought in for further evaluation.  He has no complaints other than the swelling in his right knee.  States he did not injure his head or upper extremities.  Unable to move or feel in his lower extremities.  Denies being on anticoagulation.  No other complaints              Objective:     Past Medical History:    History of spinal fusion    Paraplegia (HCC)              Past Surgical History:   Procedure Laterality Date    Other surgical history      T12 Spinal surgery Has stabilizor and screws placed.    Other surgical history      surgery for chest nerves cut and burnt off                 Social History     Socioeconomic History    Marital status: Single   Tobacco Use    Smoking status: Every Day     Current packs/day: 1.00     Types: Cigarettes     Passive exposure: Current    Smokeless tobacco: Never    Tobacco comments:     10 cigs   Vaping Use    Vaping status: Never Used   Substance and Sexual Activity    Alcohol use: No    Drug use: Yes     Types: Cannabis     Comment: Medical marijuana evryday   Social History Narrative    The patient uses the following assistive device(s):  wheelchair.      The patient does live in a home with stairs.                  Physical Exam     ED Triage Vitals [02/15/25 1022]   /89   Pulse 113   Resp 18   Temp 98.4 °F (36.9 °C)   Temp src Oral   SpO2 95 %   O2 Device None (Room air)       Current  Vitals:   Vital Signs  BP: 115/76  Pulse: 88  Resp: 18  Temp: 98.4 °F (36.9 °C)  Temp src: Oral  MAP (mmHg): 84    Oxygen Therapy  SpO2: 94 %  O2 Device: None (Room air)        Physical Exam  HENT:      Head: Normocephalic.      Right Ear: External ear normal.      Left Ear: External ear normal.      Nose: Nose normal.      Mouth/Throat:      Mouth: Mucous membranes are moist.   Eyes:      Extraocular Movements: Extraocular movements intact.      Pupils: Pupils are equal, round, and reactive to light.   Cardiovascular:      Rate and Rhythm: Regular rhythm.      Pulses: Normal pulses.   Pulmonary:      Effort: Pulmonary effort is normal.      Breath sounds: Normal breath sounds.   Abdominal:      General: Abdomen is flat.      Palpations: Abdomen is soft.   Musculoskeletal:      Cervical back: Normal range of motion. No tenderness.      Comments: Right knee has a moderate effusion.  Unable to assess tenderness.  I can passively range the knee 30 degrees.  Does not seem to have laxity.  Distally I can feel a strong pedal pulse and the foot is warm with good cap refill.  No swelling above or below the knee.   Skin:     General: Skin is warm.      Capillary Refill: Capillary refill takes less than 2 seconds.   Neurological:      Mental Status: He is alert.      Comments: Paraplegic.  Upper extremities have normal strength and sensation.             ED Course     Labs Reviewed   COMP METABOLIC PANEL (14) - Abnormal; Notable for the following components:       Result Value    Glucose 113 (*)     Potassium 3.2 (*)     Creatinine 0.49 (*)     BUN/CREA Ratio 20.4 (*)      (*)     AST 67 (*)     Alkaline Phosphatase 120 (*)     All other components within normal limits   CBC WITH DIFFERENTIAL WITH PLATELET   RAINBOW DRAW LAVENDER   RAINBOW DRAW LIGHT GREEN   RAINBOW DRAW BLUE   RAINBOW DRAW GOLD       ED Course as of 02/15/25 1308  ------------------------------------------------------------  Time: 02/15 1111  Comment:  Labs independently interpreted by me.  CBC normal, mild hypokalemia on the CMP.  Mild transaminitis.  I compared to laboratory studies from 3 months ago and he did have a transaminitis at that time as well.  I will discuss alcohol use with the patient.  ------------------------------------------------------------  Time: 02/15 1146  Comment: X-ray independently interpreted by me.  There is a tibial plateau fracture.  I spoke to the PA from [facility] orthopedics Miss Thomas  ------------------------------------------------------------  Time: 02/15 1307  Comment: Discussed with orthopedics.  They requested a hinged knee brace to allow up to 90 degrees flexion.  We have placed the knee brace and given patient good instructions.  Potassium will be given.              MDM      XR KNEE (3 VIEWS), RIGHT (CPT=73562)    Result Date: 2/15/2025  CONCLUSION: Comminuted nondisplaced fracture of the medial tibial plateau associated with a large lipohemarthrosis.     Dictated by (CST): Ulises Burton MD on 2/15/2025 at 11:16 AM     Finalized by (CST): Ulises Burton MD on 2/15/2025 at 11:18 AM                 Medical Decision Making  Patient with paraplegia here with right knee injury that occurred around 9 PM last night.  He is now having swelling in the knee.  Differential could include was not limited to fracture, dislocation, meniscus injury, ligamentous disruption and many other possibilities.  Will start with an x-ray.  Patient looks comfortable.    Amount and/or Complexity of Data Reviewed  Labs: ordered. Decision-making details documented in ED Course.     Details: Hypokalemia  Radiology: ordered and independent interpretation performed. Decision-making details documented in ED Course.     Details: Fracture tibial plateau  Discussion of management or test interpretation with external provider(s): See ED course.  Patient will continue his home meds.    Risk  Risk Details: Paraplegia        Disposition and Plan     Clinical  Impression:  1. Closed fracture of right tibial plateau, initial encounter    2. Hypokalemia         Disposition:  Discharge  2/15/2025  1:08 pm    Follow-up:  Murtaza Lin MD  [address] [facility] [id number] [phone]    Follow up      [address] [address]  Follow up            Medications Prescribed:  Current Discharge Medication List              Supplementary Documentation:

## 2025-02-15 NOTE — DISCHARGE INSTRUCTIONS
Continue medications.  Eat bananas and citrus fruits to help your potassium.  Follow-up with your doctor Monday to have your potassium rechecked in about a week or so.  Follow-up with the orthopedic group for further evaluation of your tibial plateau fracture.  Wear the brace until you see the orthopedics for further  instructions.

## 2025-02-25 ENCOUNTER — OFFICE VISIT (OUTPATIENT)
Facility: LOCATION | Age: 40
End: 2025-02-25

## 2025-02-25 ENCOUNTER — TELEPHONE (OUTPATIENT)
Facility: LOCATION | Age: 40
End: 2025-02-25

## 2025-02-25 VITALS
BODY MASS INDEX: 23.1 KG/M2 | WEIGHT: 165 LBS | HEART RATE: 77 BPM | SYSTOLIC BLOOD PRESSURE: 138 MMHG | HEIGHT: 71 IN | DIASTOLIC BLOOD PRESSURE: 87 MMHG

## 2025-02-25 DIAGNOSIS — S82.101A CLOSED FRACTURE OF PROXIMAL END OF RIGHT TIBIA, UNSPECIFIED FRACTURE MORPHOLOGY, INITIAL ENCOUNTER: Primary | ICD-10-CM

## 2025-02-25 DIAGNOSIS — G82.20 PARAPLEGIA AT T4 LEVEL (HCC): ICD-10-CM

## 2025-02-25 DIAGNOSIS — R25.2 SPASTICITY: ICD-10-CM

## 2025-02-25 DIAGNOSIS — G83.9 SPASTIC PARALYSIS (HCC): ICD-10-CM

## 2025-02-25 DIAGNOSIS — F41.9 ANXIETY: ICD-10-CM

## 2025-02-25 DIAGNOSIS — K21.9 GASTROESOPHAGEAL REFLUX DISEASE WITHOUT ESOPHAGITIS: ICD-10-CM

## 2025-02-25 PROCEDURE — 99214 OFFICE O/P EST MOD 30 MIN: CPT | Performed by: STUDENT IN AN ORGANIZED HEALTH CARE EDUCATION/TRAINING PROGRAM

## 2025-02-25 RX ORDER — PANTOPRAZOLE SODIUM 40 MG/1
40 TABLET, DELAYED RELEASE ORAL
Qty: 90 TABLET | Refills: 6 | Status: SHIPPED | OUTPATIENT
Start: 2025-02-25

## 2025-02-25 RX ORDER — MIRTAZAPINE 30 MG/1
30 TABLET, FILM COATED ORAL NIGHTLY
Qty: 90 TABLET | Refills: 6 | Status: SHIPPED | OUTPATIENT
Start: 2025-02-25

## 2025-02-25 RX ORDER — BACLOFEN 5 MG/1
TABLET ORAL 3 TIMES DAILY PRN
Qty: 270 TABLET | Refills: 6 | Status: SHIPPED | OUTPATIENT
Start: 2025-02-25

## 2025-02-25 RX ORDER — ESCITALOPRAM OXALATE 10 MG/1
10 TABLET ORAL DAILY
Qty: 90 TABLET | Refills: 6 | Status: SHIPPED | OUTPATIENT
Start: 2025-02-25

## 2025-02-25 RX ORDER — DIAZEPAM 10 MG/1
10 TABLET ORAL EVERY 8 HOURS PRN
Qty: 90 TABLET | Refills: 3 | Status: SHIPPED | OUTPATIENT
Start: 2025-02-25

## 2025-02-25 NOTE — TELEPHONE ENCOUNTER
Brother wants the order for patient's mri printed.  He will pick it up within the hour. Turner's # is [phone]

## 2025-02-25 NOTE — PROGRESS NOTES
OFFICE NOTE       The following individual(s) verbally consented to be recorded using ambient AI listening technology and understand that they can each withdraw their consent to this listening technology at any point by asking the clinician to turn off or pause the recording:    Patient name: Keyur Duckworth  Additional names:            Patient ID: Keyur Duckworth is a 39 year old male.  Today's Date: 02/25/25  Chief Complaint: Establish Care and Knee Pain (R knee fracture)      History of Present Illness  Keyur Duckworth is a 39 year old male with paraplegia at T4 level who presents to establish care with a new internal medicine physician.    He has paraplegia at the T4 level, leading to complications such as neuropathic pain, osteopenia, recurrent urinary tract infections, neurogenic bladder, erectile dysfunction, insomnia, osteoporosis, gastroesophageal reflux disease (GERD), and anxiety. These conditions significantly impact his quality of life and require ongoing management.    Recently, he sustained a closed fracture of the right tibial plateau when his leg got caught in a wheelchair footrest during a transfer to a car. He was evaluated in the [place] [facility] on February 15th and referred to the [facility]. He is currently using a hinge knee brace and has had x-rays taken. He is concerned about swelling in his ankle a few days post-injury and is worried about potential tendon and ligament damage.    He is on chronic pain management with medications including oxycodone and hydrocodone, prescribed by his pain doctor at [facility]. He also takes baclofen and diazepam, with the latter having three refills remaining. These medications are crucial for managing his neuropathic pain and muscle spasms.    For anxiety, he is prescribed Lexapro and mirtazapine, which are managed by his pain doctor. Diazepam is also used for anxiety  management, and he has three refills remaining for this medication.    He manages GERD with prazosin for acid reduction, which helps control his symptoms effectively.       Vitals:    02/25/25 1129   BP: 138/87   Pulse: 77   Weight: 165 lb (74.8 kg)   Height: 5' 11\" (1.803 m)     body mass index is 23.01 kg/m².  BP Readings from Last 3 Encounters:   02/25/25 138/87   02/15/25 115/76   10/22/24 125/78     The ASCVD Risk score (Nia GRANT, et al., 2019) failed to calculate for the following reasons:    The 2019 ASCVD risk score is only valid for ages 40 to 79  Results  RADIOLOGY  Right knee X-ray: Closed fracture of the right tibial plateau (02/15/2025)       Medications reviewed:  Current Outpatient Medications   Medication Sig Dispense Refill    diazePAM 10 MG Oral Tab Take 1 tablet (10 mg total) by mouth every 8 (eight) hours as needed (muscle spasticity). Dose increase MD approved. 90 tablet 3    baclofen 5 MG Oral Tab Take 1-2 tablets (5-10 mg total) by mouth 3 (three) times daily as needed (for muscle spasm). 270 tablet 6    mirtazapine 30 MG Oral Tab Take 1 tablet (30 mg total) by mouth nightly. 90 tablet 6    escitalopram 10 MG Oral Tab Take 1 tablet (10 mg total) by mouth daily. FOR ANXIETY. 90 tablet 6    pantoprazole 40 MG Oral Tab EC Take 1 tablet (40 mg total) by mouth every morning before breakfast. 90 tablet 6    fexofenadine (ALLERGY RELIEF) 180 MG Oral Tab Take 1 tablet (180 mg total) by mouth nightly. 90 tablet 3    prazosin 1 MG Oral Cap Take 1 capsule (1 mg total) by mouth nightly. 90 capsule 6    Tri-Mix Double Strength (PPP) Injection Solution Inject 1 mL as directed as needed.     Inject intracavernosally as directed prior to intercourse     Tri-Mix Double Strength Recipe:  - Prostaglandin E1 11.8 ug/ml  - Papaverine HCI 18mg/ ml  - Phentolamine Mesylate 0.6 mg/ ml 8 mL 1    Solifenacin Succinate 10 MG Oral Tab Take 1 tablet (10 mg total) by mouth daily. 90 tablet 6    OxyCODONE HCl IR 15 MG Oral  Tab Take 1 tablet (15 mg total) by mouth every 8 (eight) hours as needed.      OxyCODONE HCl IR 30 MG Oral Tab Take 1 tablet (30 mg total) by mouth every 8 (eight) hours as needed for Pain.      fluticasone-salmeterol (ADVAIR DISKUS) 250-50 MCG/ACT Inhalation Aerosol Powder, Breath Activated Inhale 1 puff into the lungs 2 (two) times daily. (Patient not taking: Reported on 2/25/2025) 14 each 0    fluticasone-salmeterol (WIXELA INHUB) 250-50 MCG/ACT Inhalation Aerosol Powder, Breath Activated Inhale 1 puff into the lungs every 12 (twelve) hours. FOR ASTHMA. (Patient not taking: Reported on 2/25/2025) 3 each 9    Azelastine HCl 137 MCG/SPRAY Nasal Solution 1 spray by Nasal route 2 (two) times a day. FOR SINUS SYMPTOMS/NASAL CONGESTION. (Patient not taking: Reported on 2/25/2025) 30 mL 3         Assessment & Plan    1. Closed fracture of proximal end of right tibia, unspecified fracture morphology, initial encounter (Primary)  -     MRI KNEE, RIGHT (BRK=61308); Future; Expected date: 02/25/2025  2. Paraplegia at T4 level (HCC)  -     diazePAM; Take 1 tablet (10 mg total) by mouth every 8 (eight) hours as needed (muscle spasticity). Dose increase MD approved.  Dispense: 90 tablet; Refill: 3  3. Spasticity  -     diazePAM; Take 1 tablet (10 mg total) by mouth every 8 (eight) hours as needed (muscle spasticity). Dose increase MD approved.  Dispense: 90 tablet; Refill: 3  4. Spastic paralysis (HCC)  -     Baclofen; Take 1-2 tablets (5-10 mg total) by mouth 3 (three) times daily as needed (for muscle spasm).  Dispense: 270 tablet; Refill: 6  5. Anxiety  -     Mirtazapine; Take 1 tablet (30 mg total) by mouth nightly.  Dispense: 90 tablet; Refill: 6  -     Escitalopram Oxalate; Take 1 tablet (10 mg total) by mouth daily. FOR ANXIETY.  Dispense: 90 tablet; Refill: 6  6. Gastroesophageal reflux disease without esophagitis  -     Pantoprazole Sodium; Take 1 tablet (40 mg total) by mouth every morning before breakfast.   Dispense: 90 tablet; Refill: 6    Assessment & Plan  Right tibial plateau fracture  Closed fracture due to wheelchair transfer accident. Patient has paraplegia at T4 level and is concerned about potential ligament damage due to delayed swelling. Currently managed with a hinged knee brace.  -Order MRI of the right knee to assess for ligament damage.  -Continue follow-up with orthopedic specialist.    Chronic pain management  Patient has a history of neuropathic pain and is currently managed by a pain clinic with oxycodone and hydrocodone.  -Continue current pain management plan with the pain clinic.    Anxiety  Managed with Lexapro and mirtazapine.  -Refill diazepam prescription and continue current management plan.    Osteoporosis  Patient has a history of osteoporosis and is concerned about bone density.  -Continue follow-up with bone specialist for bone density check.    GERD  Managed with prazosin.  -Continue current management plan.    Insomnia  No changes discussed in the conversation.  -Continue current management plan.    Neurogenic bladder  No changes discussed in the conversation.  -Continue current management plan.    Erectile dysfunction  No changes discussed in the conversation.  -Continue current management plan.       Follow Up: As needed/if symptoms worsen or No follow-ups on file..         Objective/ Results:   Physical Exam  Musculoskeletal:         General: Tenderness (right knee) present.        Physical Exam  CHEST: Lungs and heart normal     Reviewed:    Patient Active Problem List    Diagnosis    Closed fracture of right proximal tibia    Other osteoporosis without current pathological fracture    Gastroesophageal reflux disease without esophagitis    Anal sphincter incompetence    Pressure injury of right buttock, stage 1    Osteopenia determined by x-ray    Recurrent UTI    Erectile disorder due to medical condition in male    Migraine without aura and without status migrainosus, not  intractable    Current smoker    Paraplegia at T4 level (HCC)    History of spinal fusion    Insomnia    Anxiety    Neurogenic bladder    Neuropathic pain      Allergies[1]     Social History     Socioeconomic History    Marital status: Single   Tobacco Use    Smoking status: Every Day     Current packs/day: 1.00     Types: Cigarettes     Passive exposure: Current    Smokeless tobacco: Never   Vaping Use    Vaping status: Never Used   Substance and Sexual Activity    Alcohol use: No    Drug use: Yes     Types: Cannabis     Comment: Medical marijuana evryday   Social History Narrative    The patient uses the following assistive device(s):  wheelchair.      The patient does live in a home with stairs.      Review of Systems   Constitutional: Negative.    Respiratory: Negative.     Cardiovascular: Negative.    Gastrointestinal: Negative.    Genitourinary: Negative.    Musculoskeletal: Negative.        All other systems negative unless otherwise stated in ROS or HPI above.       Gavin Cazares MD  Internal Medicine       Call office with any questions or seek emergency care if necessary.   Patient understands and agrees to follow directions and advice.      ----------------------------------------- PATIENT INSTRUCTIONS-----------------------------------------     There are no Patient Instructions on file for this visit.        The 21st Century Cures Act makes medical notes available to patients in the interest of transparency.  However, please be advised that this is a medical document.  It is intended as a peer to peer communication.  It is written in medical language and may contain abbreviations or verbiage that are technical and unfamiliar.  It may appear blunt or direct.  Medical documents are intended to carry relevant information, facts as evident, and the clinical opinion of the practitioner.          [1] No Known Allergies

## 2025-02-25 NOTE — TELEPHONE ENCOUNTER
Patient seen today in office by Dr. Gavin Cazares. Would like to change pcp to Dr. Gavin Cazares. please remove any prior pcp and change to Gavin Cazares  Pcp removal order placed

## 2025-02-26 ENCOUNTER — TELEPHONE (OUTPATIENT)
Dept: INTERNAL MEDICINE CLINIC | Facility: CLINIC | Age: 40
End: 2025-02-26

## 2025-02-26 ENCOUNTER — PATIENT OUTREACH (OUTPATIENT)
Dept: CASE MANAGEMENT | Age: 40
End: 2025-02-26

## 2025-02-26 NOTE — PROCEDURES
Received order requesting to update PCP to Dr. Gavin Cazares is Approved and finalized on February 26, 2025.    Removed Murtaza Lin MD as the patient's Primary Care Physician

## 2025-02-26 NOTE — TELEPHONE ENCOUNTER
Prior Authorization      diazePAM 10 MG Oral Tab, Take 1 tablet (10 mg total) by mouth every 8 (eight) hours as needed (muscle spasticity). Dose increase MD approved., Disp: 90 tablet, Rfl: 3    [id number]

## 2025-02-28 ENCOUNTER — TELEPHONE (OUTPATIENT)
Dept: INTERNAL MEDICINE CLINIC | Facility: CLINIC | Age: 40
End: 2025-02-28

## 2025-02-28 ENCOUNTER — TELEPHONE (OUTPATIENT)
Facility: LOCATION | Age: 40
End: 2025-02-28

## 2025-02-28 ENCOUNTER — MED REC SCAN ONLY (OUTPATIENT)
Dept: INTERNAL MEDICINE CLINIC | Facility: CLINIC | Age: 40
End: 2025-02-28

## 2025-02-28 NOTE — TELEPHONE ENCOUNTER
Reached out to [facility] and confirmed report was received. Report given to provider for review.

## 2025-02-28 NOTE — TELEPHONE ENCOUNTER
Hello Please relay to pt MRI right knee shows Acute comminuted fracture of medial and lateral plateaus.   I have placed a referral to Ortho Dr. Sinha    [phone]     Please relay pt to see ortho

## 2025-02-28 NOTE — TELEPHONE ENCOUNTER
Aly Ovalle called and wants to know if we have received the final results of the patient's MRI knee. The MRI knee has abnormal results. RN contacted the [facility] clinic, but no one is answering the phone. The clinic is on lunch break until 1 pm. We will wait after the lunch break. [facility] is requesting a call back for updates.     RN   called [facility] and spoke with Carmel. She confirmed that they have not received any fax yet. RN instructed her  to monitor the fax and call [facility] once she receives the results.

## 2025-02-28 NOTE — TELEPHONE ENCOUNTER
Called patient,verified name and date of birth.   Reviewed results and recommendations from Dr Cazares's 2/28/25 note below.  Patient verbalizes understanding.  Dr Sinha's office number provided to schedule an appointment.    Dr Cazares is out of office, message also sent to podmate Dr Levin.    Dr Cazares/Dr Canales, patient asks if the MRI shows any damage to the ligaments in his knee in adddtion to the fracture?

## 2025-02-28 NOTE — TELEPHONE ENCOUNTER
I dont have the mri report and not in the chart so will need to wait for DR Cazares to answer this question.

## 2025-03-01 NOTE — TELEPHONE ENCOUNTER
I do not have the MRI report scanned in our system, but did not mention tendon but I would like him to see another orthopedic Dr. Sinha for second opinion for intervention.

## 2025-03-03 NOTE — TELEPHONE ENCOUNTER
Spoke with patient,  verified.   Gave him Dr Cazares's message.  He plans to follow up with a Dr Rufina Lin, [facility].

## 2025-03-11 NOTE — TELEPHONE ENCOUNTER
See 2/28/25 test results encounter .     IM ADO CLINICAL STAFF =please disregard .   Encounter closed.

## 2025-04-04 ENCOUNTER — TELEPHONE (OUTPATIENT)
Dept: INTERNAL MEDICINE CLINIC | Facility: CLINIC | Age: 40
End: 2025-04-04

## 2025-04-04 NOTE — TELEPHONE ENCOUNTER
Patient called requesting a refill on the following medication:    diazePAM 10 MG Oral Tab     Per patient send refill to the following pharmacy:    [facility]    [address] [place] [address]

## 2025-04-04 NOTE — TELEPHONE ENCOUNTER
Prescription from 2/25/25 has refills    Outpatient Medication Detail   Disp Refills Start End    diazePAM 10 MG Oral Tab 90 tablet 3 2/25/2025 --    Sig - Route: Take 1 tablet (10 mg total) by mouth every 8 (eight) hours as needed (muscle spasticity). Dose increase MD approved. - Oral    Sent to pharmacy as: diazePAM 10 MG Oral Tablet (Valium)    E-Prescribing Status: Receipt confirmed by pharmacy (2/25/2025 11:45 AM CST)    No prior authorization was found for this prescription.    Found prior authorization for another prescription for the same medication: Approved    Associated Diagnoses  Paraplegia at T4 level ([facility])      Spasticity      Pharmacy  [id number] - [place], [place] - [address]  AT [facility] [address], [phone], [phone]

## 2025-04-08 ENCOUNTER — LAB ENCOUNTER (OUTPATIENT)
Dept: LAB | Age: 40
End: 2025-04-08
Attending: PHYSICIAN ASSISTANT
Payer: MEDICAID

## 2025-04-08 DIAGNOSIS — M80.061A: Primary | ICD-10-CM

## 2025-04-08 LAB
CALCIUM BLD-MCNC: 9.8 MG/DL (ref 8.7–10.4)
CREAT BLD-MCNC: 0.46 MG/DL
PHOSPHATE SERPL-MCNC: 4.6 MG/DL (ref 2.4–5.1)
PTH-INTACT SERPL-MCNC: 12.6 PG/ML (ref 18.5–88)
VIT D+METAB SERPL-MCNC: 19.9 NG/ML (ref 30–100)

## 2025-04-08 PROCEDURE — 82306 VITAMIN D 25 HYDROXY: CPT

## 2025-04-08 PROCEDURE — 82565 ASSAY OF CREATININE: CPT

## 2025-04-08 PROCEDURE — 84100 ASSAY OF PHOSPHORUS: CPT

## 2025-04-08 PROCEDURE — 82523 COLLAGEN CROSSLINKS: CPT

## 2025-04-08 PROCEDURE — 83970 ASSAY OF PARATHORMONE: CPT

## 2025-04-08 PROCEDURE — 82310 ASSAY OF CALCIUM: CPT

## 2025-04-08 PROCEDURE — 36415 COLL VENOUS BLD VENIPUNCTURE: CPT

## 2025-04-10 LAB — PROPEPTIDE TYPE I COLLAGEN: 61.4 NG/ML

## 2025-05-02 ENCOUNTER — TELEPHONE (OUTPATIENT)
Dept: INTERNAL MEDICINE CLINIC | Facility: CLINIC | Age: 40
End: 2025-05-02

## 2025-05-02 NOTE — TELEPHONE ENCOUNTER
To complete the PA, please visit [email or web address]   KEY: [email or web address]  Please notify the pharmacy when you receive a determination from the plan.   Medication:      pantoprazole 40 MG Oral Tab EC, Take 1 tablet (40 mg total) by mouth every morning before breakfast., Disp: 90 tablet, Rfl: 6

## 2025-05-03 NOTE — TELEPHONE ENCOUNTER
Prior authorization for pantoprazole was done through [employer]. It can take 1-5 business days for a decision to come back

## 2025-05-05 NOTE — TELEPHONE ENCOUNTER
Please inform patient prior auth did not go thru. Can pay out of pocket, and use [employer] coupon

## 2025-05-05 NOTE — TELEPHONE ENCOUNTER
Awaiting denial letter     Denied    Note from payer: Details of this decision are provided on the physician outcome notice which has been faxed to the number on file.  Payer: [employer] [facility] Case ID: [id number]    [phone]  Electronic appeal: Not supported  View History

## 2025-05-12 ENCOUNTER — TELEPHONE (OUTPATIENT)
Dept: INTERNAL MEDICINE CLINIC | Facility: CLINIC | Age: 40
End: 2025-05-12

## 2025-05-12 NOTE — TELEPHONE ENCOUNTER
Pharmacy requesting refill    SULFAMETH/TRIMETHOPRIM 800/160MG   TAKE 1 TABLET BY MOUTH TWICE DAILY WITH FOOD FOR 7 DAYS

## 2025-05-12 NOTE — TELEPHONE ENCOUNTER
REFILL NOT APPROPRIATE.    This refill request is for an antibiotic.    This was last ordered by Dr. Murtaza Lin 12/2/24 to take for 7 days.      We request patient schedule an appointment or call and speak with a nurse in regards to symptoms.

## 2025-05-14 ENCOUNTER — OFFICE VISIT (OUTPATIENT)
Dept: INTERNAL MEDICINE CLINIC | Facility: CLINIC | Age: 40
End: 2025-05-14

## 2025-05-14 VITALS — HEART RATE: 73 BPM | DIASTOLIC BLOOD PRESSURE: 62 MMHG | SYSTOLIC BLOOD PRESSURE: 100 MMHG

## 2025-05-14 DIAGNOSIS — G82.20 PARAPLEGIA AT T4 LEVEL (HCC): ICD-10-CM

## 2025-05-14 DIAGNOSIS — F41.9 ANXIETY: ICD-10-CM

## 2025-05-14 DIAGNOSIS — R25.2 SPASTICITY: ICD-10-CM

## 2025-05-14 DIAGNOSIS — K21.9 GASTROESOPHAGEAL REFLUX DISEASE WITHOUT ESOPHAGITIS: ICD-10-CM

## 2025-05-14 PROCEDURE — 99215 OFFICE O/P EST HI 40 MIN: CPT | Performed by: STUDENT IN AN ORGANIZED HEALTH CARE EDUCATION/TRAINING PROGRAM

## 2025-05-14 RX ORDER — ESCITALOPRAM OXALATE 20 MG/1
20 TABLET ORAL DAILY
Qty: 90 TABLET | Refills: 3 | Status: SHIPPED | OUTPATIENT
Start: 2025-05-14

## 2025-05-14 RX ORDER — DIAZEPAM 10 MG/1
15 TABLET ORAL EVERY 8 HOURS PRN
Qty: 90 TABLET | Refills: 3 | Status: SHIPPED | OUTPATIENT
Start: 2025-05-14

## 2025-05-14 RX ORDER — PANTOPRAZOLE SODIUM 40 MG/1
40 TABLET, DELAYED RELEASE ORAL
Qty: 90 TABLET | Refills: 3 | Status: SHIPPED | OUTPATIENT
Start: 2025-05-14

## 2025-05-14 NOTE — PROGRESS NOTES
OFFICE NOTE       The following individual(s) verbally consented to be recorded using ambient AI listening technology and understand that they can each withdraw their consent to this listening technology at any point by asking the clinician to turn off or pause the recording:    Patient name: Keyur Duckworth  Additional names:            Patient ID: Keyur Duckworth is a 39 year old male.  Today's Date: 05/14/25  Chief Complaint: Follow - Up      History of Present Illness  Keyur Duckworth is a 39 year old male with paraplegia who presents with complications of neuropathic pain, anxiety, and medication management.    He experiences panic attacks that have increased in frequency and intensity, lasting approximately 40 minutes. These episodes are described as uncontrollable and significantly impact his daily activities. Despite taking diazepam 10 mg, he finds it less effective, often requiring one and a half pills per day, sometimes more depending on the frequency of attacks. His anxiety level can reach 'fifteen or twenty' on a subjective scale, despite taking medication for depression.    He has a history of neuropathic pain associated with nerve damage, and he experiences back pain during anxiety episodes. He has been on various medications for depression over the past 13 years, including Lexapro, which he is currently taking at 10 mg. He has previously tried Cymbalta without success.    He has a history of osteopenia and osteoporosis, for which he receives monthly injections to improve bone density. He recently had an MRI on March 11, 2025, and is followed by orthopedics, receiving monthly x-rays.    He experiences recurrent urinary tract infections and has a neurogenic bladder, for which he is followed by urology. He also reports erectile dysfunction, insomnia, and GERD, for which he takes over-the-counter medications. He uses omeprazole or pantoprazole for GERD  management.       Vitals:    05/14/25 1610   BP: 100/62   Pulse: 73     body mass index is unknown because there is no height or weight on file.  BP Readings from Last 3 Encounters:   05/14/25 100/62   02/25/25 138/87   02/15/25 115/76     The ASCVD Risk score (Nia GRANT, et al., 2019) failed to calculate for the following reasons:    The 2019 ASCVD risk score is only valid for ages 40 to 79  Results  RADIOLOGY  MRI of right proximal leg: Acute comminuted three-column fracture of the right proximal tibia, muscle contraction of the right anterior and posterior compartment musculature, large knee effusion, extensive circumferential subcutaneous edema of the right knee (03/28/2025)       Medications reviewed:  Current Medications[1]      Assessment & Plan    1. Paraplegia at T4 level (HCC)  -     diazePAM; Take 1.5 tablets (15 mg total) by mouth every 8 (eight) hours as needed (muscle spasticity). Dose increase MD approved.  Dispense: 90 tablet; Refill: 3  2. Spasticity  -     diazePAM; Take 1.5 tablets (15 mg total) by mouth every 8 (eight) hours as needed (muscle spasticity). Dose increase MD approved.  Dispense: 90 tablet; Refill: 3  3. Anxiety  -     Escitalopram Oxalate; Take 1 tablet (20 mg total) by mouth daily. FOR ANXIETY.  Dispense: 90 tablet; Refill: 3  4. Gastroesophageal reflux disease without esophagitis  -     Pantoprazole Sodium; Take 1 tablet (40 mg total) by mouth every morning before breakfast.  Dispense: 90 tablet; Refill: 3    Assessment & Plan  Paraplegia at T4 level  Chronic paraplegia with neuropathic pain, neurogenic bladder, and recurrent UTIs. Managed with specialist follow-up.    Neuropathic pain  Chronic pain with decreased diazepam efficacy. Panic attacks and anxiety may exacerbate pain. Prefers diazepam for its duration.  - Increase diazepam to 15 mg, up to three times daily as needed.    Neurogenic bladder  Managed by urology specialists. No new issues.    Anxiety  Chronic anxiety with  increased panic attacks. Current diazepam less effective. Lexapro not at optimal dose.  - Increase Lexapro to 20 mg daily.  - Monitor response to increased Lexapro dose and adjust as needed.    Osteoporosis  Osteoporosis confirmed by DEXA scan. Receiving monthly injections.  - Continue monthly injections for bone density.    Gastroesophageal reflux disease (GERD)  Chronic GERD managed with OTC medications. Open to prescription management.  - Prescribe pantoprazole for GERD management.       Follow Up: As needed/if symptoms worsen or Return in about 11 weeks (around 7/30/2025)..     Objective/ Results:   Physical Exam  Constitutional:       Appearance: He is well-developed.   Cardiovascular:      Rate and Rhythm: Normal rate and regular rhythm.      Heart sounds: Normal heart sounds.   Pulmonary:      Effort: Pulmonary effort is normal.      Breath sounds: Normal breath sounds.   Abdominal:      General: Bowel sounds are normal.      Palpations: Abdomen is soft.   Musculoskeletal:         General: Tenderness present.      Comments: Spasticity/spasms (parplegic at T4)      Skin:     General: Skin is warm and dry.   Neurological:      Mental Status: He is alert and oriented to person, place, and time.      Deep Tendon Reflexes: Reflexes are normal and symmetric.        Physical Exam         Reviewed:    Patient Active Problem List    Diagnosis    Closed fracture of right proximal tibia    Other osteoporosis without current pathological fracture    Gastroesophageal reflux disease without esophagitis    Anal sphincter incompetence    Pressure injury of right buttock, stage 1    Osteopenia determined by x-ray    Recurrent UTI    Erectile disorder due to medical condition in male    Migraine without aura and without status migrainosus, not intractable    Current smoker    Paraplegia at T4 level (HCC)    History of spinal fusion    Insomnia    Anxiety    Neurogenic bladder    Neuropathic pain      Allergies[2]   Short  Social Hx on File[3]   Review of Systems   Constitutional: Negative.    Respiratory: Negative.     Cardiovascular: Negative.    Gastrointestinal: Negative.    Musculoskeletal:  Positive for back pain.   Skin: Negative.    Neurological: Negative.    Psychiatric/Behavioral:  Positive for sleep disturbance. The patient is nervous/anxious.        All other systems negative unless otherwise stated in ROS or HPI above.       Gavin Cazares MD  Internal Medicine       Call office with any questions or seek emergency care if necessary.   Patient understands and agrees to follow directions and advice.      ----------------------------------------- PATIENT INSTRUCTIONS-----------------------------------------     There are no Patient Instructions on file for this visit.        The 21st Century Cures Act makes medical notes available to patients in the interest of transparency.  However, please be advised that this is a medical document.  It is intended as a peer to peer communication.  It is written in medical language and may contain abbreviations or verbiage that are technical and unfamiliar.  It may appear blunt or direct.  Medical documents are intended to carry relevant information, facts as evident, and the clinical opinion of the practitioner.          [1]   Current Outpatient Medications   Medication Sig Dispense Refill    diazePAM 10 MG Oral Tab Take 1.5 tablets (15 mg total) by mouth every 8 (eight) hours as needed (muscle spasticity). Dose increase MD approved. 90 tablet 3    escitalopram 20 MG Oral Tab Take 1 tablet (20 mg total) by mouth daily. FOR ANXIETY. 90 tablet 3    pantoprazole 40 MG Oral Tab EC Take 1 tablet (40 mg total) by mouth every morning before breakfast. 90 tablet 3    baclofen 5 MG Oral Tab Take 1-2 tablets (5-10 mg total) by mouth 3 (three) times daily as needed (for muscle spasm). 270 tablet 6    mirtazapine 30 MG Oral Tab Take 1 tablet (30 mg total) by mouth nightly. 90 tablet 6    fexofenadine  (ALLERGY RELIEF) 180 MG Oral Tab Take 1 tablet (180 mg total) by mouth nightly. 90 tablet 3    fluticasone-salmeterol (ADVAIR DISKUS) 250-50 MCG/ACT Inhalation Aerosol Powder, Breath Activated Inhale 1 puff into the lungs 2 (two) times daily. 14 each 0    prazosin 1 MG Oral Cap Take 1 capsule (1 mg total) by mouth nightly. 90 capsule 6    Tri-Mix Double Strength (PPP) Injection Solution Inject 1 mL as directed as needed.     Inject intracavernosally as directed prior to intercourse     Tri-Mix Double Strength Recipe:  - Prostaglandin E1 11.8 ug/ml  - Papaverine HCI 18mg/ ml  - Phentolamine Mesylate 0.6 mg/ ml 8 mL 1    Solifenacin Succinate 10 MG Oral Tab Take 1 tablet (10 mg total) by mouth daily. 90 tablet 6    fluticasone-salmeterol (WIXELA INHUB) 250-50 MCG/ACT Inhalation Aerosol Powder, Breath Activated Inhale 1 puff into the lungs every 12 (twelve) hours. FOR ASTHMA. 3 each 9    Azelastine HCl 137 MCG/SPRAY Nasal Solution 1 spray by Nasal route 2 (two) times a day. FOR SINUS SYMPTOMS/NASAL CONGESTION. 30 mL 3    OxyCODONE HCl IR 15 MG Oral Tab Take 1 tablet (15 mg total) by mouth every 8 (eight) hours as needed.      OxyCODONE HCl IR 30 MG Oral Tab Take 1 tablet (30 mg total) by mouth every 8 (eight) hours as needed for Pain.     [2]   Allergies  Allergen Reactions    Duloxetine NAUSEA AND VOMITING   [3]   Social History  Socioeconomic History    Marital status: Single   Tobacco Use    Smoking status: Every Day     Current packs/day: 1.00     Types: Cigarettes     Passive exposure: Current    Smokeless tobacco: Never   Vaping Use    Vaping status: Never Used   Substance and Sexual Activity    Alcohol use: No    Drug use: Yes     Types: Cannabis     Comment: Medical marijuana evryday   Social History Narrative    The patient uses the following assistive device(s):  wheelchair.      The patient does live in a home with stairs.

## 2025-05-15 ENCOUNTER — TELEPHONE (OUTPATIENT)
Dept: FAMILY MEDICINE CLINIC | Facility: CLINIC | Age: 40
End: 2025-05-15

## 2025-05-15 NOTE — TELEPHONE ENCOUNTER
Prior Authorization Needed:    Current Outpatient Medications on File Prior to Visit   Medication Sig Dispense Refill    diazePAM 10 MG Oral Tab Take 1.5 tablets (15 mg total) by mouth every 8 (eight) hours as needed (muscle spasticity). Dose increase MD approved. 90 tablet 3       Login to go[employer]/login and click \"Enter a Key\"    Key: [id number]  Last Name: Donita  : 1985    Please advise

## 2025-05-16 NOTE — TELEPHONE ENCOUNTER
Patient called to notify office that prior auth is required for Diazepam.   Advised patient this prior auth was approved today and effective for 1 year, can notify pharmacy of approval.   Patient verbalized understanding with no further questions.

## 2025-05-16 NOTE — TELEPHONE ENCOUNTER
Prior authorization for diazepam was done through [employer]. It can take up to 14 business days for a decision to come back

## 2025-07-07 ENCOUNTER — TELEPHONE (OUTPATIENT)
Dept: INTERNAL MEDICINE CLINIC | Facility: CLINIC | Age: 40
End: 2025-07-07

## 2025-07-07 DIAGNOSIS — R25.2 SPASTICITY: ICD-10-CM

## 2025-07-07 DIAGNOSIS — G82.20 PARAPLEGIA AT T4 LEVEL (HCC): ICD-10-CM

## 2025-07-07 RX ORDER — DIAZEPAM 10 MG/1
15 TABLET ORAL EVERY 8 HOURS PRN
Qty: 135 TABLET | Refills: 3 | Status: SHIPPED | OUTPATIENT
Start: 2025-07-07

## 2025-07-07 NOTE — TELEPHONE ENCOUNTER
Gavin Cazares MD to Em Rn Triage        7/7/25 11:23 AM  Pt known to me and already discussed long term refills in the past, Just sent refills,     Patient's brother notified, verbalized understanding.   (On verbal release)

## 2025-07-07 NOTE — TELEPHONE ENCOUNTER
Brother Turner(on HIPAA) indicated that patient takes diazepam 10mg 1.5 tablet every 8 hours. Prescribed 90 tablets with 3 refills. States that the pharmacy will not fill the medication even though patient is out of medication. Patient has not had any medication and has been waiting it out until he is able to fill the medication. Quantity is not for the full 30 days.      Called [facility] pharmacy Carl indicated that the insurance will only fill the diazepam once a month. Will fill the diazepam today since due now, but would need a script for 30 days supply to be able to fill the next script. Medication pended.  Please advise.

## 2025-08-11 ENCOUNTER — TELEPHONE (OUTPATIENT)
Dept: INTERNAL MEDICINE CLINIC | Facility: CLINIC | Age: 40
End: 2025-08-11

## (undated) DIAGNOSIS — G89.4 CHRONIC PAIN DISORDER: ICD-10-CM

## (undated) DIAGNOSIS — M79.2 NEUROPATHIC PAIN: ICD-10-CM

## (undated) DIAGNOSIS — G82.20 PARAPLEGIA AT T4 LEVEL (HCC): ICD-10-CM

## (undated) DIAGNOSIS — Z12.83 SKIN CANCER SCREENING: Primary | ICD-10-CM

## (undated) DIAGNOSIS — Z01.818 PREOPERATIVE EXAMINATION: Primary | ICD-10-CM

## (undated) NOTE — Clinical Note
Suki, sending your way. Looking to establish care, see if any improvements can be made, Has been having odd episodes of shaking, doesn't appear to be seizures as he is aware, improves with diazepam, has pain stimulator. Pt aware more of a conversation to see if anything better can be provided. Let mek now if any questions/concerns, thanks! -Murtaza

## (undated) NOTE — LETTER
[address] [place] [address], [address] [place] [address]  [phone]            Patient Information:  Patient Name:  Jose Salazar, ([id number]) Sex: male : 1985   ____________________________________

## (undated) NOTE — LETTER
03/23/21        Corey  [address] [facility]      Dear [facility],    Our records indicate that you have outstanding lab work and or testing that was ordered for you and has not yet been completed:  Orders Placed This Encounter

## (undated) NOTE — LETTER
2/3/2024              Keyur Duckworth        [address] [facility] [address]         Dear Keyur,    This letter is to inform you that our office has made several attempts to reach you by phone without success.  We were attempting to contact you by phone regarding order from your provider.    Please contact our office at the number listed below as soon as you receive this letter to discuss this issue and to make the necessary changes in our system to your contact information.  Thank you for your cooperation.        Sincerely,    Murtaza Lin MD  [address] [facility] [id number] [phone]                  Document electronically generated by:  Herminia LEE RN

## (undated) NOTE — LETTER
Department: [facility] [place]  Phone: [phone]  Ordering Provider: Juaquin Bamberger  Ordering Provider Address: [address] [facility] [address]  Ordering Provider Phone: [phone]  Ordering Provider Fax: [phone]

## (undated) NOTE — LETTER
10/28/20        [facility]  [address]      Dear Jun Shanks,    Our records indicate that you have outstanding lab work and or testing that was ordered for you and has not yet been completed:  Orders Placed This Encounter

## (undated) NOTE — LETTER
8/31/2020              Tessa Duckworth        [address] [facility]         Dear Becca Mcclure,    This letter is to inform you that our office has made several attempts to reach you by phone without success.   We were attempting to co

## (undated) NOTE — LETTER
08/14/21        Corey  [address] [address]      Dear Chery Randhawa records indicate that you have outstanding lab work and or testing that was ordered for you and has not yet been completed:  Orders Placed This [place]

## (undated) NOTE — LETTER
12/22/20        Chato Duckworth  [address]      Dear Edgar Villegas,    Our records indicate that you have outstanding lab work and or testing that was ordered for you and has not yet been completed:  Orders Placed This Encounter

## (undated) NOTE — LETTER
10/31/2023          To Whom It May Concern:    Tip Jones is currently under my medical care and has the chronic conditions as noted below. Please provide Joseph Jose with a medical marijuana card. Thank you for your time. Problem List:  2023-10: Osteopenia determined by x-ray  2022-08: Mixed anxiety depressive disorder  2022-05: Migraine without aura and without status migrainosus, not   intractable  2018-08: Chronic nonintractable headache  2018-08: Paraplegia at T4 level [facility])  2017-04: Chronic pain disorder  2017-04: History of spinal fusion  2017-04: Insomnia  2016-09: Anxiety  2013-01: History of spinal cord injury  2013-01: Spinal cord injury  2012-07: Neuropathic pain    If you require additional information please contact our office.         Sincerely,    José Miguel Dey MD          Document generated by:  José Miguel Dey MD

## (undated) NOTE — LETTER
Department: [facility] [place]  Phone: [phone]  Ordering Provider: Daniele Davila  Ordering Provider Address: [address] [facility] [address]  Ordering Provider Phone: [phone]  Ordering Provider Fax: [phone]

## (undated) NOTE — LETTER
8/25/2020              Penny Duckworth        [address] [facility]         Dear Radha Mclean,      Sincerely,    May Fortune MD  [address] [address] [phone]